# Patient Record
Sex: FEMALE | Race: BLACK OR AFRICAN AMERICAN | NOT HISPANIC OR LATINO | Employment: FULL TIME | URBAN - METROPOLITAN AREA
[De-identification: names, ages, dates, MRNs, and addresses within clinical notes are randomized per-mention and may not be internally consistent; named-entity substitution may affect disease eponyms.]

---

## 2016-11-30 LAB
CFTR MUT ANL BLD/T: NEGATIVE
EXTERNAL ABO GROUPING: NORMAL
EXTERNAL ANTIBODY SCREEN: NORMAL
EXTERNAL HEMATOCRIT: 34.6 %
EXTERNAL HEMOGLOBIN: 11.5 G/DL
EXTERNAL HEPATITIS B SURFACE ANTIGEN: NORMAL
EXTERNAL HIV-1 ANTIBODY: NORMAL
EXTERNAL PLATELET COUNT: 225 K/ΜL
EXTERNAL RH FACTOR: POSITIVE
EXTERNAL RUBELLA IGG QUANTITATION: NORMAL
EXTERNAL SYPHILIS RPR SCREEN: NORMAL

## 2017-01-05 ENCOUNTER — ALLSCRIPTS OFFICE VISIT (OUTPATIENT)
Dept: PERINATAL CARE | Facility: CLINIC | Age: 42
End: 2017-01-05
Payer: COMMERCIAL

## 2017-01-05 ENCOUNTER — GENERIC CONVERSION - ENCOUNTER (OUTPATIENT)
Dept: OTHER | Facility: OTHER | Age: 42
End: 2017-01-05

## 2017-01-05 PROCEDURE — 76817 TRANSVAGINAL US OBSTETRIC: CPT | Performed by: OBSTETRICS & GYNECOLOGY

## 2017-01-05 PROCEDURE — 76805 OB US >/= 14 WKS SNGL FETUS: CPT | Performed by: OBSTETRICS & GYNECOLOGY

## 2017-01-12 ENCOUNTER — ALLSCRIPTS OFFICE VISIT (OUTPATIENT)
Dept: PERINATAL CARE | Facility: CLINIC | Age: 42
End: 2017-01-12
Payer: COMMERCIAL

## 2017-01-12 ENCOUNTER — GENERIC CONVERSION - ENCOUNTER (OUTPATIENT)
Dept: OTHER | Facility: OTHER | Age: 42
End: 2017-01-12

## 2017-01-12 PROCEDURE — 76817 TRANSVAGINAL US OBSTETRIC: CPT | Performed by: OBSTETRICS & GYNECOLOGY

## 2017-01-14 ENCOUNTER — ANESTHESIA (OUTPATIENT)
Dept: LABOR AND DELIVERY | Facility: HOSPITAL | Age: 42
End: 2017-01-14
Payer: COMMERCIAL

## 2017-01-14 ENCOUNTER — HOSPITAL ENCOUNTER (OUTPATIENT)
Facility: HOSPITAL | Age: 42
Setting detail: OBSERVATION
Discharge: HOME | End: 2017-01-14
Attending: OBSTETRICS & GYNECOLOGY | Admitting: OBSTETRICS & GYNECOLOGY
Payer: COMMERCIAL

## 2017-01-14 ENCOUNTER — HOSPITAL ENCOUNTER (OUTPATIENT)
Facility: HOSPITAL | Age: 42
Setting detail: SURGERY ADMIT
End: 2017-01-14
Attending: OBSTETRICS & GYNECOLOGY | Admitting: OBSTETRICS & GYNECOLOGY
Payer: COMMERCIAL

## 2017-01-14 ENCOUNTER — ANESTHESIA EVENT (OUTPATIENT)
Dept: LABOR AND DELIVERY | Facility: HOSPITAL | Age: 42
End: 2017-01-14
Payer: COMMERCIAL

## 2017-01-14 VITALS
DIASTOLIC BLOOD PRESSURE: 66 MMHG | HEIGHT: 70 IN | HEART RATE: 80 BPM | TEMPERATURE: 98.1 F | OXYGEN SATURATION: 100 % | BODY MASS INDEX: 27.77 KG/M2 | SYSTOLIC BLOOD PRESSURE: 113 MMHG | WEIGHT: 194 LBS | RESPIRATION RATE: 16 BRPM

## 2017-01-14 PROBLEM — O09.899 H/O PRETERM DELIVERY, CURRENTLY PREGNANT: Status: ACTIVE | Noted: 2017-01-14

## 2017-01-14 PROBLEM — O26.872 CERVICAL SHORTENING DURING PREGNANCY IN SECOND TRIMESTER: Status: ACTIVE | Noted: 2017-01-14

## 2017-01-14 LAB
ABO GROUP BLD: NORMAL
BLD GP AB SCN SERPL QL: NEGATIVE
ERYTHROCYTE [DISTWIDTH] IN BLOOD BY AUTOMATED COUNT: 13.6 % (ref 11.6–15.1)
HCT VFR BLD AUTO: 33.4 % (ref 34.8–46.1)
HGB BLD-MCNC: 11.3 G/DL (ref 11.5–15.4)
MCH RBC QN AUTO: 31.7 PG (ref 26.8–34.3)
MCHC RBC AUTO-ENTMCNC: 33.8 G/DL (ref 31.4–37.4)
MCV RBC AUTO: 94 FL (ref 82–98)
PLATELET # BLD AUTO: 181 THOUSANDS/UL (ref 149–390)
PMV BLD AUTO: 10.5 FL (ref 8.9–12.7)
RBC # BLD AUTO: 3.57 MILLION/UL (ref 3.81–5.12)
RH BLD: POSITIVE
WBC # BLD AUTO: 5.84 THOUSAND/UL (ref 4.31–10.16)

## 2017-01-14 PROCEDURE — 86900 BLOOD TYPING SEROLOGIC ABO: CPT | Performed by: OBSTETRICS & GYNECOLOGY

## 2017-01-14 PROCEDURE — 86901 BLOOD TYPING SEROLOGIC RH(D): CPT | Performed by: OBSTETRICS & GYNECOLOGY

## 2017-01-14 PROCEDURE — 85027 COMPLETE CBC AUTOMATED: CPT | Performed by: OBSTETRICS & GYNECOLOGY

## 2017-01-14 PROCEDURE — 86850 RBC ANTIBODY SCREEN: CPT | Performed by: OBSTETRICS & GYNECOLOGY

## 2017-01-14 RX ORDER — INDOMETHACIN 25 MG/1
25 CAPSULE ORAL EVERY 6 HOURS
Status: DISCONTINUED | OUTPATIENT
Start: 2017-01-14 | End: 2017-01-14 | Stop reason: HOSPADM

## 2017-01-14 RX ORDER — INDOMETHACIN 25 MG/1
50 CAPSULE ORAL ONCE
Status: COMPLETED | OUTPATIENT
Start: 2017-01-14 | End: 2017-01-14

## 2017-01-14 RX ORDER — ONDANSETRON 2 MG/ML
4 INJECTION INTRAMUSCULAR; INTRAVENOUS ONCE
Status: DISCONTINUED | OUTPATIENT
Start: 2017-01-14 | End: 2017-01-14 | Stop reason: HOSPADM

## 2017-01-14 RX ORDER — SODIUM CHLORIDE, SODIUM LACTATE, POTASSIUM CHLORIDE, CALCIUM CHLORIDE 600; 310; 30; 20 MG/100ML; MG/100ML; MG/100ML; MG/100ML
125 INJECTION, SOLUTION INTRAVENOUS CONTINUOUS
Status: DISCONTINUED | OUTPATIENT
Start: 2017-01-14 | End: 2017-01-14 | Stop reason: HOSPADM

## 2017-01-14 RX ORDER — ACETAMINOPHEN 325 MG/1
650 TABLET ORAL EVERY 4 HOURS PRN
Status: DISCONTINUED | OUTPATIENT
Start: 2017-01-14 | End: 2017-01-14 | Stop reason: HOSPADM

## 2017-01-14 RX ORDER — FENTANYL CITRATE/PF 50 MCG/ML
25 SYRINGE (ML) INJECTION
Status: DISCONTINUED | OUTPATIENT
Start: 2017-01-14 | End: 2017-01-14 | Stop reason: HOSPADM

## 2017-01-14 RX ORDER — SODIUM CHLORIDE, SODIUM LACTATE, POTASSIUM CHLORIDE, CALCIUM CHLORIDE 600; 310; 30; 20 MG/100ML; MG/100ML; MG/100ML; MG/100ML
125 INJECTION, SOLUTION INTRAVENOUS CONTINUOUS
Status: DISCONTINUED | OUTPATIENT
Start: 2017-01-14 | End: 2017-01-14 | Stop reason: SDUPTHER

## 2017-01-14 RX ORDER — BUPIVACAINE HYDROCHLORIDE 7.5 MG/ML
INJECTION, SOLUTION EPIDURAL; RETROBULBAR AS NEEDED
Status: DISCONTINUED | OUTPATIENT
Start: 2017-01-14 | End: 2017-01-14 | Stop reason: SURG

## 2017-01-14 RX ADMIN — SODIUM CHLORIDE, SODIUM LACTATE, POTASSIUM CHLORIDE, AND CALCIUM CHLORIDE 125 ML/HR: .6; .31; .03; .02 INJECTION, SOLUTION INTRAVENOUS at 07:10

## 2017-01-14 RX ADMIN — INDOMETHACIN 50 MG: 25 CAPSULE ORAL at 07:34

## 2017-01-14 RX ADMIN — BUPIVACAINE HYDROCHLORIDE 1 ML: 7.5 INJECTION, SOLUTION EPIDURAL; RETROBULBAR at 08:19

## 2017-01-18 ENCOUNTER — GENERIC CONVERSION - ENCOUNTER (OUTPATIENT)
Dept: OTHER | Facility: OTHER | Age: 42
End: 2017-01-18

## 2017-01-18 ENCOUNTER — ALLSCRIPTS OFFICE VISIT (OUTPATIENT)
Dept: PERINATAL CARE | Facility: CLINIC | Age: 42
End: 2017-01-18
Payer: COMMERCIAL

## 2017-01-18 PROCEDURE — 76817 TRANSVAGINAL US OBSTETRIC: CPT | Performed by: OBSTETRICS & GYNECOLOGY

## 2017-01-18 PROCEDURE — 76815 OB US LIMITED FETUS(S): CPT | Performed by: OBSTETRICS & GYNECOLOGY

## 2017-01-31 ENCOUNTER — ALLSCRIPTS OFFICE VISIT (OUTPATIENT)
Dept: PERINATAL CARE | Facility: CLINIC | Age: 42
End: 2017-01-31
Payer: COMMERCIAL

## 2017-01-31 ENCOUNTER — GENERIC CONVERSION - ENCOUNTER (OUTPATIENT)
Dept: OTHER | Facility: OTHER | Age: 42
End: 2017-01-31

## 2017-01-31 PROCEDURE — 76817 TRANSVAGINAL US OBSTETRIC: CPT | Performed by: OBSTETRICS & GYNECOLOGY

## 2017-01-31 PROCEDURE — 76811 OB US DETAILED SNGL FETUS: CPT | Performed by: OBSTETRICS & GYNECOLOGY

## 2017-03-30 ENCOUNTER — GENERIC CONVERSION - ENCOUNTER (OUTPATIENT)
Dept: OTHER | Facility: OTHER | Age: 42
End: 2017-03-30

## 2017-03-30 ENCOUNTER — APPOINTMENT (OUTPATIENT)
Dept: PERINATAL CARE | Facility: CLINIC | Age: 42
End: 2017-03-30
Payer: COMMERCIAL

## 2017-03-30 ENCOUNTER — ALLSCRIPTS OFFICE VISIT (OUTPATIENT)
Dept: PERINATAL CARE | Facility: CLINIC | Age: 42
End: 2017-03-30
Payer: COMMERCIAL

## 2017-03-30 PROCEDURE — 76816 OB US FOLLOW-UP PER FETUS: CPT | Performed by: OBSTETRICS & GYNECOLOGY

## 2017-03-30 PROCEDURE — G0108 DIAB MANAGE TRN  PER INDIV: HCPCS | Performed by: OBSTETRICS & GYNECOLOGY

## 2017-04-06 ENCOUNTER — APPOINTMENT (OUTPATIENT)
Dept: PERINATAL CARE | Facility: CLINIC | Age: 42
End: 2017-04-06
Payer: COMMERCIAL

## 2017-04-06 ENCOUNTER — GENERIC CONVERSION - ENCOUNTER (OUTPATIENT)
Dept: OTHER | Facility: OTHER | Age: 42
End: 2017-04-06

## 2017-04-06 PROCEDURE — G0108 DIAB MANAGE TRN  PER INDIV: HCPCS | Performed by: OBSTETRICS & GYNECOLOGY

## 2017-04-27 ENCOUNTER — ALLSCRIPTS OFFICE VISIT (OUTPATIENT)
Dept: PERINATAL CARE | Facility: CLINIC | Age: 42
End: 2017-04-27
Payer: COMMERCIAL

## 2017-04-27 ENCOUNTER — GENERIC CONVERSION - ENCOUNTER (OUTPATIENT)
Dept: OTHER | Facility: OTHER | Age: 42
End: 2017-04-27

## 2017-04-27 PROCEDURE — 76816 OB US FOLLOW-UP PER FETUS: CPT | Performed by: OBSTETRICS & GYNECOLOGY

## 2017-05-18 LAB — EXTERNAL GROUP B STREP ANTIGEN: POSITIVE

## 2017-05-24 ENCOUNTER — GENERIC CONVERSION - ENCOUNTER (OUTPATIENT)
Dept: OTHER | Facility: OTHER | Age: 42
End: 2017-05-24

## 2017-05-24 ENCOUNTER — ALLSCRIPTS OFFICE VISIT (OUTPATIENT)
Dept: PERINATAL CARE | Facility: CLINIC | Age: 42
End: 2017-05-24
Payer: COMMERCIAL

## 2017-05-24 ENCOUNTER — APPOINTMENT (OUTPATIENT)
Dept: PERINATAL CARE | Facility: CLINIC | Age: 42
End: 2017-05-24
Payer: COMMERCIAL

## 2017-05-24 PROCEDURE — 76818 FETAL BIOPHYS PROFILE W/NST: CPT | Performed by: OBSTETRICS & GYNECOLOGY

## 2017-05-24 PROCEDURE — 76816 OB US FOLLOW-UP PER FETUS: CPT | Performed by: OBSTETRICS & GYNECOLOGY

## 2017-05-30 ENCOUNTER — ALLSCRIPTS OFFICE VISIT (OUTPATIENT)
Dept: PERINATAL CARE | Facility: CLINIC | Age: 42
End: 2017-05-30
Payer: COMMERCIAL

## 2017-05-30 ENCOUNTER — GENERIC CONVERSION - ENCOUNTER (OUTPATIENT)
Dept: OTHER | Facility: OTHER | Age: 42
End: 2017-05-30

## 2017-05-30 PROCEDURE — 76818 FETAL BIOPHYS PROFILE W/NST: CPT | Performed by: OBSTETRICS & GYNECOLOGY

## 2017-06-02 ENCOUNTER — APPOINTMENT (OUTPATIENT)
Dept: PERINATAL CARE | Facility: CLINIC | Age: 42
End: 2017-06-02
Payer: COMMERCIAL

## 2017-06-02 ENCOUNTER — GENERIC CONVERSION - ENCOUNTER (OUTPATIENT)
Dept: OTHER | Facility: OTHER | Age: 42
End: 2017-06-02

## 2017-06-02 ENCOUNTER — ALLSCRIPTS OFFICE VISIT (OUTPATIENT)
Dept: PERINATAL CARE | Facility: CLINIC | Age: 42
End: 2017-06-02
Payer: COMMERCIAL

## 2017-06-02 PROCEDURE — G0108 DIAB MANAGE TRN  PER INDIV: HCPCS | Performed by: OBSTETRICS & GYNECOLOGY

## 2017-06-02 PROCEDURE — 59025 FETAL NON-STRESS TEST: CPT | Performed by: OBSTETRICS & GYNECOLOGY

## 2017-06-06 ENCOUNTER — ALLSCRIPTS OFFICE VISIT (OUTPATIENT)
Dept: PERINATAL CARE | Facility: CLINIC | Age: 42
DRG: 560 | End: 2017-06-06
Payer: COMMERCIAL

## 2017-06-06 ENCOUNTER — GENERIC CONVERSION - ENCOUNTER (OUTPATIENT)
Dept: OTHER | Facility: OTHER | Age: 42
End: 2017-06-06

## 2017-06-06 ENCOUNTER — HOSPITAL ENCOUNTER (INPATIENT)
Facility: HOSPITAL | Age: 42
LOS: 2 days | Discharge: HOME | DRG: 560 | End: 2017-06-08
Attending: OBSTETRICS & GYNECOLOGY | Admitting: OBSTETRICS & GYNECOLOGY
Payer: COMMERCIAL

## 2017-06-06 DIAGNOSIS — O26.872: ICD-10-CM

## 2017-06-06 DIAGNOSIS — Z3A.38 38 WEEKS GESTATION OF PREGNANCY: Primary | ICD-10-CM

## 2017-06-06 DIAGNOSIS — O09.899 H/O PRETERM DELIVERY, CURRENTLY PREGNANT: ICD-10-CM

## 2017-06-06 LAB
ABO GROUP BLD: NORMAL
BASOPHILS # BLD AUTO: 0.01 THOUSANDS/ΜL (ref 0–0.1)
BASOPHILS NFR BLD AUTO: 0 % (ref 0–1)
BLD GP AB SCN SERPL QL: NEGATIVE
EOSINOPHIL # BLD AUTO: 0.06 THOUSAND/ΜL (ref 0–0.61)
EOSINOPHIL NFR BLD AUTO: 1 % (ref 0–6)
ERYTHROCYTE [DISTWIDTH] IN BLOOD BY AUTOMATED COUNT: 13.3 % (ref 11.6–15.1)
GLUCOSE SERPL-MCNC: 131 MG/DL (ref 65–140)
GLUCOSE SERPL-MCNC: 132 MG/DL (ref 65–140)
GLUCOSE SERPL-MCNC: 62 MG/DL (ref 65–140)
GLUCOSE SERPL-MCNC: 63 MG/DL (ref 65–140)
GLUCOSE SERPL-MCNC: 92 MG/DL (ref 65–140)
GLUCOSE SERPL-MCNC: 97 MG/DL (ref 65–140)
HCT VFR BLD AUTO: 38.4 % (ref 34.8–46.1)
HGB BLD-MCNC: 13.2 G/DL (ref 11.5–15.4)
LYMPHOCYTES # BLD AUTO: 1.62 THOUSANDS/ΜL (ref 0.6–4.47)
LYMPHOCYTES NFR BLD AUTO: 30 % (ref 14–44)
MCH RBC QN AUTO: 33 PG (ref 26.8–34.3)
MCHC RBC AUTO-ENTMCNC: 34.4 G/DL (ref 31.4–37.4)
MCV RBC AUTO: 96 FL (ref 82–98)
MONOCYTES # BLD AUTO: 0.62 THOUSAND/ΜL (ref 0.17–1.22)
MONOCYTES NFR BLD AUTO: 11 % (ref 4–12)
NEUTROPHILS # BLD AUTO: 3.1 THOUSANDS/ΜL (ref 1.85–7.62)
NEUTS SEG NFR BLD AUTO: 58 % (ref 43–75)
NRBC BLD AUTO-RTO: 0 /100 WBCS
PLATELET # BLD AUTO: 177 THOUSANDS/UL (ref 149–390)
PMV BLD AUTO: 11.2 FL (ref 8.9–12.7)
RBC # BLD AUTO: 4 MILLION/UL (ref 3.81–5.12)
RH BLD: POSITIVE
SPECIMEN EXPIRATION DATE: NORMAL
WBC # BLD AUTO: 5.42 THOUSAND/UL (ref 4.31–10.16)

## 2017-06-06 PROCEDURE — 86901 BLOOD TYPING SEROLOGIC RH(D): CPT | Performed by: OBSTETRICS & GYNECOLOGY

## 2017-06-06 PROCEDURE — 85025 COMPLETE CBC W/AUTO DIFF WBC: CPT | Performed by: OBSTETRICS & GYNECOLOGY

## 2017-06-06 PROCEDURE — 86850 RBC ANTIBODY SCREEN: CPT | Performed by: OBSTETRICS & GYNECOLOGY

## 2017-06-06 PROCEDURE — 86592 SYPHILIS TEST NON-TREP QUAL: CPT | Performed by: OBSTETRICS & GYNECOLOGY

## 2017-06-06 PROCEDURE — 4A1HXCZ MONITORING OF PRODUCTS OF CONCEPTION, CARDIAC RATE, EXTERNAL APPROACH: ICD-10-PCS | Performed by: OBSTETRICS & GYNECOLOGY

## 2017-06-06 PROCEDURE — 82948 REAGENT STRIP/BLOOD GLUCOSE: CPT

## 2017-06-06 PROCEDURE — 3E033VJ INTRODUCTION OF OTHER HORMONE INTO PERIPHERAL VEIN, PERCUTANEOUS APPROACH: ICD-10-PCS | Performed by: OBSTETRICS & GYNECOLOGY

## 2017-06-06 PROCEDURE — 86900 BLOOD TYPING SEROLOGIC ABO: CPT | Performed by: OBSTETRICS & GYNECOLOGY

## 2017-06-06 PROCEDURE — 0UCC7ZZ EXTIRPATION OF MATTER FROM CERVIX, VIA NATURAL OR ARTIFICIAL OPENING: ICD-10-PCS | Performed by: OBSTETRICS & GYNECOLOGY

## 2017-06-06 PROCEDURE — 76818 FETAL BIOPHYS PROFILE W/NST: CPT | Performed by: OBSTETRICS & GYNECOLOGY

## 2017-06-06 RX ORDER — OXYTOCIN/RINGER'S LACTATE 30/500 ML
1-30 PLASTIC BAG, INJECTION (ML) INTRAVENOUS
Status: DISCONTINUED | OUTPATIENT
Start: 2017-06-06 | End: 2017-06-07

## 2017-06-06 RX ORDER — SODIUM CHLORIDE, SODIUM LACTATE, POTASSIUM CHLORIDE, CALCIUM CHLORIDE 600; 310; 30; 20 MG/100ML; MG/100ML; MG/100ML; MG/100ML
125 INJECTION, SOLUTION INTRAVENOUS CONTINUOUS
Status: DISCONTINUED | OUTPATIENT
Start: 2017-06-06 | End: 2017-06-07

## 2017-06-06 RX ORDER — SODIUM CHLORIDE 9 MG/ML
125 INJECTION, SOLUTION INTRAVENOUS CONTINUOUS
Status: DISCONTINUED | OUTPATIENT
Start: 2017-06-06 | End: 2017-06-07

## 2017-06-06 RX ADMIN — Medication 2 MILLI-UNITS/MIN: at 18:40

## 2017-06-06 RX ADMIN — SODIUM CHLORIDE 2.5 MILLION UNITS: 9 INJECTION, SOLUTION INTRAVENOUS at 21:12

## 2017-06-06 RX ADMIN — SODIUM CHLORIDE, SODIUM LACTATE, POTASSIUM CHLORIDE, AND CALCIUM CHLORIDE 125 ML/HR: .6; .31; .03; .02 INJECTION, SOLUTION INTRAVENOUS at 14:49

## 2017-06-06 RX ADMIN — SODIUM CHLORIDE 5 MILLION UNITS: 0.9 INJECTION, SOLUTION INTRAVENOUS at 16:52

## 2017-06-06 RX ADMIN — SODIUM CHLORIDE 125 ML/HR: 0.9 INJECTION, SOLUTION INTRAVENOUS at 20:14

## 2017-06-07 ENCOUNTER — ANESTHESIA (INPATIENT)
Dept: LABOR AND DELIVERY | Facility: HOSPITAL | Age: 42
DRG: 560 | End: 2017-06-07
Payer: COMMERCIAL

## 2017-06-07 PROBLEM — O09.529 ADVANCED MATERNAL AGE IN MULTIGRAVIDA: Status: ACTIVE | Noted: 2017-06-07

## 2017-06-07 PROBLEM — Z98.891 H/O CESAREAN SECTION: Status: ACTIVE | Noted: 2017-06-07

## 2017-06-07 PROBLEM — O24.410 GESTATIONAL DIABETES MELLITUS, CLASS A1: Status: ACTIVE | Noted: 2017-06-07

## 2017-06-07 LAB
BASE EXCESS BLDCOA CALC-SCNC: -4.1 MMOL/L (ref 3–11)
BASE EXCESS BLDCOV CALC-SCNC: -4.8 MMOL/L (ref 1–9)
GLUCOSE SERPL-MCNC: 70 MG/DL (ref 65–140)
GLUCOSE SERPL-MCNC: 78 MG/DL (ref 65–140)
GLUCOSE SERPL-MCNC: 92 MG/DL (ref 65–140)
HCO3 BLDCOA-SCNC: 24 MMOL/L (ref 17.3–27.3)
HCO3 BLDCOV-SCNC: 21.6 MMOL/L (ref 12.2–28.6)
O2 CT VFR BLDCOA CALC: 2.8 ML/DL
OXYHGB MFR BLDCOA: 12.4 %
OXYHGB MFR BLDCOV: 50.3 %
PCO2 BLDCOA: 55.8 MM[HG] (ref 30–60)
PCO2 BLDCOV: 44.4 MM HG (ref 27–43)
PH BLDCOA: 7.25 [PH] (ref 7.23–7.43)
PH BLDCOV: 7.3 [PH] (ref 7.19–7.49)
PO2 BLDCOA: 10.4 MM HG (ref 5–25)
PO2 BLDCOV: 24.7 MM HG (ref 15–45)
RPR SER QL: NORMAL
SAO2 % BLDCOV: 11.6 ML/DL

## 2017-06-07 PROCEDURE — 10907ZC DRAINAGE OF AMNIOTIC FLUID, THERAPEUTIC FROM PRODUCTS OF CONCEPTION, VIA NATURAL OR ARTIFICIAL OPENING: ICD-10-PCS | Performed by: OBSTETRICS & GYNECOLOGY

## 2017-06-07 PROCEDURE — 82948 REAGENT STRIP/BLOOD GLUCOSE: CPT

## 2017-06-07 PROCEDURE — 0KQM0ZZ REPAIR PERINEUM MUSCLE, OPEN APPROACH: ICD-10-PCS | Performed by: OBSTETRICS & GYNECOLOGY

## 2017-06-07 PROCEDURE — 82805 BLOOD GASES W/O2 SATURATION: CPT | Performed by: OBSTETRICS & GYNECOLOGY

## 2017-06-07 RX ORDER — FENTANYL CITRATE 50 UG/ML
INJECTION, SOLUTION INTRAMUSCULAR; INTRAVENOUS AS NEEDED
Status: DISCONTINUED | OUTPATIENT
Start: 2017-06-07 | End: 2017-06-07 | Stop reason: SURG

## 2017-06-07 RX ORDER — MAGNESIUM HYDROXIDE/ALUMINUM HYDROXICE/SIMETHICONE 120; 1200; 1200 MG/30ML; MG/30ML; MG/30ML
15 SUSPENSION ORAL EVERY 6 HOURS PRN
Status: DISCONTINUED | OUTPATIENT
Start: 2017-06-07 | End: 2017-06-08 | Stop reason: HOSPADM

## 2017-06-07 RX ORDER — DOCUSATE SODIUM 100 MG/1
100 CAPSULE, LIQUID FILLED ORAL 2 TIMES DAILY PRN
Status: DISCONTINUED | OUTPATIENT
Start: 2017-06-07 | End: 2017-06-08 | Stop reason: HOSPADM

## 2017-06-07 RX ORDER — ONDANSETRON 2 MG/ML
4 INJECTION INTRAMUSCULAR; INTRAVENOUS EVERY 8 HOURS PRN
Status: DISCONTINUED | OUTPATIENT
Start: 2017-06-07 | End: 2017-06-08 | Stop reason: HOSPADM

## 2017-06-07 RX ORDER — FENTANYL CITRATE 50 UG/ML
INJECTION, SOLUTION INTRAMUSCULAR; INTRAVENOUS
Status: DISPENSED
Start: 2017-06-07 | End: 2017-06-07

## 2017-06-07 RX ORDER — ACETAMINOPHEN 325 MG/1
650 TABLET ORAL EVERY 6 HOURS PRN
Status: DISCONTINUED | OUTPATIENT
Start: 2017-06-07 | End: 2017-06-08 | Stop reason: HOSPADM

## 2017-06-07 RX ORDER — DIAPER,BRIEF,INFANT-TODD,DISP
1 EACH MISCELLANEOUS AS NEEDED
Status: DISCONTINUED | OUTPATIENT
Start: 2017-06-07 | End: 2017-06-08 | Stop reason: HOSPADM

## 2017-06-07 RX ORDER — OXYTOCIN/RINGER'S LACTATE 30/500 ML
250 PLASTIC BAG, INJECTION (ML) INTRAVENOUS CONTINUOUS
Status: ACTIVE | OUTPATIENT
Start: 2017-06-07 | End: 2017-06-07

## 2017-06-07 RX ORDER — DIPHENHYDRAMINE HCL 25 MG
25 TABLET ORAL EVERY 4 HOURS PRN
Status: DISCONTINUED | OUTPATIENT
Start: 2017-06-07 | End: 2017-06-08 | Stop reason: HOSPADM

## 2017-06-07 RX ORDER — OXYCODONE HYDROCHLORIDE AND ACETAMINOPHEN 5; 325 MG/1; MG/1
1 TABLET ORAL EVERY 4 HOURS PRN
Status: DISCONTINUED | OUTPATIENT
Start: 2017-06-07 | End: 2017-06-08 | Stop reason: HOSPADM

## 2017-06-07 RX ORDER — OXYCODONE HYDROCHLORIDE AND ACETAMINOPHEN 5; 325 MG/1; MG/1
2 TABLET ORAL EVERY 4 HOURS PRN
Status: DISCONTINUED | OUTPATIENT
Start: 2017-06-07 | End: 2017-06-08 | Stop reason: HOSPADM

## 2017-06-07 RX ORDER — BUPIVACAINE HYDROCHLORIDE 2.5 MG/ML
INJECTION, SOLUTION INFILTRATION; PERINEURAL AS NEEDED
Status: DISCONTINUED | OUTPATIENT
Start: 2017-06-07 | End: 2017-06-07 | Stop reason: SURG

## 2017-06-07 RX ORDER — IBUPROFEN 600 MG/1
600 TABLET ORAL EVERY 6 HOURS PRN
Status: DISCONTINUED | OUTPATIENT
Start: 2017-06-07 | End: 2017-06-08 | Stop reason: HOSPADM

## 2017-06-07 RX ADMIN — DOCUSATE SODIUM 100 MG: 100 CAPSULE, LIQUID FILLED ORAL at 16:44

## 2017-06-07 RX ADMIN — FENTANYL CITRATE 10 MCG: 50 INJECTION, SOLUTION INTRAMUSCULAR; INTRAVENOUS at 00:33

## 2017-06-07 RX ADMIN — BUPIVACAINE HYDROCHLORIDE 1 ML: 2.5 INJECTION, SOLUTION INFILTRATION; PERINEURAL at 00:33

## 2017-06-07 RX ADMIN — ROPIVACAINE HYDROCHLORIDE: 2 INJECTION, SOLUTION EPIDURAL; INFILTRATION at 00:42

## 2017-06-07 RX ADMIN — SODIUM CHLORIDE 125 ML/HR: 0.9 INJECTION, SOLUTION INTRAVENOUS at 02:01

## 2017-06-07 RX ADMIN — SODIUM CHLORIDE 2.5 MILLION UNITS: 9 INJECTION, SOLUTION INTRAVENOUS at 00:46

## 2017-06-07 RX ADMIN — IBUPROFEN 600 MG: 600 TABLET, FILM COATED ORAL at 16:44

## 2017-06-08 VITALS
HEART RATE: 88 BPM | DIASTOLIC BLOOD PRESSURE: 71 MMHG | HEIGHT: 68 IN | WEIGHT: 200 LBS | TEMPERATURE: 98.6 F | BODY MASS INDEX: 30.31 KG/M2 | OXYGEN SATURATION: 98 % | RESPIRATION RATE: 18 BRPM | SYSTOLIC BLOOD PRESSURE: 109 MMHG

## 2017-06-08 RX ORDER — ACETAMINOPHEN 325 MG/1
TABLET ORAL
Qty: 30 TABLET | Refills: 0 | Status: SHIPPED | OUTPATIENT
Start: 2017-06-08 | End: 2017-06-08 | Stop reason: HOSPADM

## 2017-06-08 RX ORDER — IBUPROFEN 600 MG/1
600 TABLET ORAL EVERY 6 HOURS PRN
Qty: 30 TABLET | Refills: 0
Start: 2017-06-08 | End: 2017-06-08 | Stop reason: HOSPADM

## 2017-06-08 RX ORDER — DOCUSATE SODIUM 100 MG/1
100 CAPSULE, LIQUID FILLED ORAL 2 TIMES DAILY PRN
Qty: 10 CAPSULE | Refills: 0
Start: 2017-06-08 | End: 2017-06-08 | Stop reason: HOSPADM

## 2017-06-16 LAB — PLACENTA IN STORAGE: NORMAL

## 2017-07-13 PROBLEM — Z30.2 ENCOUNTER FOR STERILIZATION: Status: ACTIVE | Noted: 2017-07-13

## 2017-07-17 ENCOUNTER — ANESTHESIA EVENT (OUTPATIENT)
Dept: PERIOP | Facility: HOSPITAL | Age: 42
End: 2017-07-17
Payer: COMMERCIAL

## 2017-07-17 ENCOUNTER — HOSPITAL ENCOUNTER (OUTPATIENT)
Facility: HOSPITAL | Age: 42
Setting detail: OUTPATIENT SURGERY
Discharge: HOME | End: 2017-07-17
Attending: OBSTETRICS & GYNECOLOGY | Admitting: OBSTETRICS & GYNECOLOGY
Payer: COMMERCIAL

## 2017-07-17 ENCOUNTER — ANESTHESIA (OUTPATIENT)
Dept: PERIOP | Facility: HOSPITAL | Age: 42
End: 2017-07-17
Payer: COMMERCIAL

## 2017-07-17 VITALS
HEART RATE: 68 BPM | TEMPERATURE: 97.2 F | HEIGHT: 66 IN | DIASTOLIC BLOOD PRESSURE: 60 MMHG | OXYGEN SATURATION: 99 % | RESPIRATION RATE: 16 BRPM | SYSTOLIC BLOOD PRESSURE: 128 MMHG | BODY MASS INDEX: 29.73 KG/M2 | WEIGHT: 185 LBS

## 2017-07-17 DIAGNOSIS — Z30.2 ENCOUNTER FOR STERILIZATION: ICD-10-CM

## 2017-07-17 LAB — EXT PREGNANCY TEST URINE: NEGATIVE

## 2017-07-17 PROCEDURE — 88302 TISSUE EXAM BY PATHOLOGIST: CPT | Performed by: OBSTETRICS & GYNECOLOGY

## 2017-07-17 PROCEDURE — 81025 URINE PREGNANCY TEST: CPT | Performed by: OBSTETRICS & GYNECOLOGY

## 2017-07-17 RX ORDER — MEPERIDINE HYDROCHLORIDE 25 MG/ML
12.5 INJECTION INTRAMUSCULAR; INTRAVENOUS; SUBCUTANEOUS AS NEEDED
Status: DISCONTINUED | OUTPATIENT
Start: 2017-07-17 | End: 2017-07-17 | Stop reason: HOSPADM

## 2017-07-17 RX ORDER — ALBUTEROL SULFATE 2.5 MG/3ML
2.5 SOLUTION RESPIRATORY (INHALATION) ONCE AS NEEDED
Status: DISCONTINUED | OUTPATIENT
Start: 2017-07-17 | End: 2017-07-17 | Stop reason: HOSPADM

## 2017-07-17 RX ORDER — GLYCOPYRROLATE 0.2 MG/ML
INJECTION INTRAMUSCULAR; INTRAVENOUS AS NEEDED
Status: DISCONTINUED | OUTPATIENT
Start: 2017-07-17 | End: 2017-07-17 | Stop reason: SURG

## 2017-07-17 RX ORDER — ONDANSETRON 2 MG/ML
INJECTION INTRAMUSCULAR; INTRAVENOUS AS NEEDED
Status: DISCONTINUED | OUTPATIENT
Start: 2017-07-17 | End: 2017-07-17 | Stop reason: SURG

## 2017-07-17 RX ORDER — ROCURONIUM BROMIDE 10 MG/ML
INJECTION, SOLUTION INTRAVENOUS AS NEEDED
Status: DISCONTINUED | OUTPATIENT
Start: 2017-07-17 | End: 2017-07-17 | Stop reason: SURG

## 2017-07-17 RX ORDER — BUPIVACAINE HYDROCHLORIDE 2.5 MG/ML
INJECTION, SOLUTION INFILTRATION; PERINEURAL AS NEEDED
Status: DISCONTINUED | OUTPATIENT
Start: 2017-07-17 | End: 2017-07-17 | Stop reason: HOSPADM

## 2017-07-17 RX ORDER — SODIUM CHLORIDE, SODIUM LACTATE, POTASSIUM CHLORIDE, CALCIUM CHLORIDE 600; 310; 30; 20 MG/100ML; MG/100ML; MG/100ML; MG/100ML
125 INJECTION, SOLUTION INTRAVENOUS CONTINUOUS
Status: DISCONTINUED | OUTPATIENT
Start: 2017-07-17 | End: 2017-07-17 | Stop reason: HOSPADM

## 2017-07-17 RX ORDER — OXYCODONE HYDROCHLORIDE AND ACETAMINOPHEN 5; 325 MG/1; MG/1
1-2 TABLET ORAL EVERY 4 HOURS PRN
Refills: 0
Start: 2017-07-17 | End: 2017-07-27

## 2017-07-17 RX ORDER — IBUPROFEN 600 MG/1
600 TABLET ORAL EVERY 6 HOURS PRN
Qty: 30 TABLET | Refills: 0
Start: 2017-07-17

## 2017-07-17 RX ORDER — LIDOCAINE HYDROCHLORIDE 10 MG/ML
INJECTION, SOLUTION INFILTRATION; PERINEURAL AS NEEDED
Status: DISCONTINUED | OUTPATIENT
Start: 2017-07-17 | End: 2017-07-17 | Stop reason: SURG

## 2017-07-17 RX ORDER — FENTANYL CITRATE/PF 50 MCG/ML
25 SYRINGE (ML) INJECTION
Status: DISCONTINUED | OUTPATIENT
Start: 2017-07-17 | End: 2017-07-17 | Stop reason: HOSPADM

## 2017-07-17 RX ORDER — MIDAZOLAM HYDROCHLORIDE 1 MG/ML
INJECTION INTRAMUSCULAR; INTRAVENOUS AS NEEDED
Status: DISCONTINUED | OUTPATIENT
Start: 2017-07-17 | End: 2017-07-17 | Stop reason: SURG

## 2017-07-17 RX ORDER — ONDANSETRON 2 MG/ML
4 INJECTION INTRAMUSCULAR; INTRAVENOUS EVERY 6 HOURS PRN
Status: DISCONTINUED | OUTPATIENT
Start: 2017-07-17 | End: 2017-07-17 | Stop reason: HOSPADM

## 2017-07-17 RX ORDER — SODIUM CHLORIDE 9 MG/ML
125 INJECTION, SOLUTION INTRAVENOUS CONTINUOUS
Status: DISCONTINUED | OUTPATIENT
Start: 2017-07-17 | End: 2017-07-17 | Stop reason: HOSPADM

## 2017-07-17 RX ORDER — KETOROLAC TROMETHAMINE 30 MG/ML
INJECTION, SOLUTION INTRAMUSCULAR; INTRAVENOUS AS NEEDED
Status: DISCONTINUED | OUTPATIENT
Start: 2017-07-17 | End: 2017-07-17 | Stop reason: SURG

## 2017-07-17 RX ORDER — FENTANYL CITRATE 50 UG/ML
INJECTION, SOLUTION INTRAMUSCULAR; INTRAVENOUS AS NEEDED
Status: DISCONTINUED | OUTPATIENT
Start: 2017-07-17 | End: 2017-07-17 | Stop reason: SURG

## 2017-07-17 RX ORDER — IBUPROFEN 600 MG/1
600 TABLET ORAL EVERY 6 HOURS PRN
Status: DISCONTINUED | OUTPATIENT
Start: 2017-07-17 | End: 2017-07-17 | Stop reason: HOSPADM

## 2017-07-17 RX ORDER — ONDANSETRON 2 MG/ML
4 INJECTION INTRAMUSCULAR; INTRAVENOUS ONCE AS NEEDED
Status: DISCONTINUED | OUTPATIENT
Start: 2017-07-17 | End: 2017-07-17 | Stop reason: HOSPADM

## 2017-07-17 RX ORDER — PROPOFOL 10 MG/ML
INJECTION, EMULSION INTRAVENOUS AS NEEDED
Status: DISCONTINUED | OUTPATIENT
Start: 2017-07-17 | End: 2017-07-17 | Stop reason: SURG

## 2017-07-17 RX ORDER — OXYCODONE HYDROCHLORIDE AND ACETAMINOPHEN 5; 325 MG/1; MG/1
2 TABLET ORAL EVERY 4 HOURS PRN
Status: DISCONTINUED | OUTPATIENT
Start: 2017-07-17 | End: 2017-07-17 | Stop reason: HOSPADM

## 2017-07-17 RX ORDER — OXYCODONE HYDROCHLORIDE AND ACETAMINOPHEN 5; 325 MG/1; MG/1
1 TABLET ORAL EVERY 4 HOURS PRN
Status: DISCONTINUED | OUTPATIENT
Start: 2017-07-17 | End: 2017-07-17 | Stop reason: HOSPADM

## 2017-07-17 RX ADMIN — FENTANYL CITRATE 50 MCG: 50 INJECTION INTRAMUSCULAR; INTRAVENOUS at 10:33

## 2017-07-17 RX ADMIN — NEOSTIGMINE METHYLSULFATE 3 MG: 1 INJECTION, SOLUTION INTRAMUSCULAR; INTRAVENOUS; SUBCUTANEOUS at 10:53

## 2017-07-17 RX ADMIN — ONDANSETRON 4 MG: 2 INJECTION INTRAMUSCULAR; INTRAVENOUS at 10:32

## 2017-07-17 RX ADMIN — PROPOFOL 30 MG: 10 INJECTION, EMULSION INTRAVENOUS at 10:32

## 2017-07-17 RX ADMIN — DEXAMETHASONE SODIUM PHOSPHATE 10 MG: 10 INJECTION INTRAMUSCULAR; INTRAVENOUS at 10:16

## 2017-07-17 RX ADMIN — LIDOCAINE HYDROCHLORIDE 50 MG: 10 INJECTION, SOLUTION INFILTRATION; PERINEURAL at 10:19

## 2017-07-17 RX ADMIN — ROCURONIUM BROMIDE 25 MG: 10 INJECTION, SOLUTION INTRAVENOUS at 10:19

## 2017-07-17 RX ADMIN — SODIUM CHLORIDE, SODIUM LACTATE, POTASSIUM CHLORIDE, AND CALCIUM CHLORIDE: .6; .31; .03; .02 INJECTION, SOLUTION INTRAVENOUS at 10:11

## 2017-07-17 RX ADMIN — FENTANYL CITRATE 50 MCG: 50 INJECTION INTRAMUSCULAR; INTRAVENOUS at 10:19

## 2017-07-17 RX ADMIN — KETOROLAC TROMETHAMINE 30 MG: 30 INJECTION, SOLUTION INTRAMUSCULAR at 11:08

## 2017-07-17 RX ADMIN — PROPOFOL 150 MG: 10 INJECTION, EMULSION INTRAVENOUS at 10:19

## 2017-07-17 RX ADMIN — MIDAZOLAM HYDROCHLORIDE 2 MG: 1 INJECTION, SOLUTION INTRAMUSCULAR; INTRAVENOUS at 10:16

## 2017-07-17 RX ADMIN — GLYCOPYRROLATE 0.4 MG: 0.2 INJECTION, SOLUTION INTRAMUSCULAR; INTRAVENOUS at 10:53

## 2018-01-10 NOTE — PROGRESS NOTES
Active Problems    1  Elderly multigravida in second trimester (659 63) (O09 522)   2  Fibroids (218 9) (D25 9)   3  Gestational diabetes mellitus (GDM) in third trimester, gestational diabetes method of   control unspecified (648 83) (O24 419)   4  History of  delivery, currently pregnant in second trimester (V23 41) (O09 212)   5  Short cervix in second trimester, antepartum (793 01) (O26 872)    Family History  Mother    1  No pertinent family history  Father    2  No pertinent family history    Social History    · Never a smoker    Current Meds   1  Accu-Chek FastClix Lancets Miscellaneous; 4 times a day as directed; Therapy: 57UKO4493 to (Evaluate:54Fuc7935)  Requested for: 39UFV0745; Last   Rx:2017 Ordered   2  Accu-Chek SmartView In Vitro Strip; TEST 4 TIMES DAILY; Therapy: 58LNA2751 to (Evaluate:05Sut3446)  Requested for: 01JDX3746; Last   Rx:2017 Ordered   3  Prenatal Vitamin TABS; TAKE 1 TABLET DAILY; Therapy: (Recorded:2017) to Recorded    Allergies    1  No Known Drug Allergies    2  No Known Environmental Allergies   3  No Known Food Allergies    Provider Comments  Provider Comments:   JAY: 2017  EGA: 29 4/ weeks    Dear Dr Mccloud Level: Thank you for referring your patient to the Diabetes and Pregnancy Program at [address] [place] [address]  The patient attended Class 2 received the following education:    Â· Weight gain during in pregnancy  Based on the patient's height of 67 inches, pre-pregnancy weight of 181 pounds (BMI-27  5) we would recommend a total weight gain of 15-25 pounds for the pregnancy  o The patient's current weight is 202 pounds, and her weight gain to date is 21 pounds  Â· Medical Nutrition Therapy for diabetes and pregnancy  The patient's three day food diary was reviewed and discussed  The patient was instructed on the following:  o Individualized meal plan  Adapted her meal plan to her favorite [place] foods    o Use of food diary to maintain a meal plan  Patient has been skipping an HS snack; understands importance of eating 3 snacks now    o Importance of protein as it relates to blood glucose control  Â· Review of blood glucose log  Reinforcement of blood glucose goals and reporting guidelines  Â· Ultrasounds every four weeks in the [address] [place] [address] to evaluate fetal growth  Â· Exercise Guidelines:   o Walking up to thirty minutes daily can reduce blood glucose levels  o Monitor for greater than four contractions per hour    o The patient has been instructed not to begin physical activity if she has been instructed not to exercise by your office  Â· Sick day guidelines  Â· Post-partum guidelines:  o Completion of a 75 gram glucose tolerance test at 6 weeks post-partum to check for type 2 diabetes  o 20% weight loss and 30 minutes of exercise 5 times per week reduces the risk of type 2 diabetes  Hammad Delgadillo Breastfeeding guidelines  Â· Report blood glucose levels to Maternal-Fetal Medicine office weekly or as directed  o Phone: [phone]  o Fax: [phone]  o Email: [email or web address]     Please contact the Diabetes and Pregnancy Program at [phone] if you have questions  Time spent with the patient--9:30-10:25 AM; time spent face to face counseling greater than 50% of the appointment  Sincerely,   Ric Swartz, MS, RD, CDE, LDN  Diabetes Educator  Diabetes and Pregnancy Program        Future Appointments    Date/Time Provider Specialty Site   2017 08:30 AM  [address]     Signatures   Electronically signed by : Anthony Cochran RD; 2017 11:14AM EST                       (Author)    Electronically signed by : Kim Lynn;  Apr 10 2017 12:54PM EST                       (Author)

## 2018-01-10 NOTE — PROGRESS NOTES
Active Problems    1  Elderly multigravida in second trimester (659 63) (O09 522)   2  Fibroids (218 9) (D25 9)   3  Gestational diabetes mellitus (GDM) in third trimester, gestational diabetes method of   control unspecified (648 83) (O24 419)   4  History of  delivery, currently pregnant in second trimester (V23 41) (O09 212)   5  Short cervix in second trimester, antepartum (382 79) (O26 872)    Family History  Mother    1  No pertinent family history  Father    2  No pertinent family history    Social History    · Never a smoker    Current Meds   1  Prenatal Vitamin TABS; TAKE 1 TABLET DAILY; Therapy: (Recorded:2017) to Recorded    Allergies    1  No Known Drug Allergies    2  No Known Environmental Allergies   3  No Known Food Allergies    Plan    1  Accu-Chek FastClix Lancets Miscellaneous; 4 times a day as directed   2  Accu-Chek SmartView In Vitro Strip; TEST 4 TIMES DAILY    Provider Comments  Provider Comments:   JAY: 2017  EGA: 28 4/7 weeks             Dear Dr Ekaterina Blount: Thank you for referring your patient to the Diabetes and Pregnancy Program at [address] [place] [address]  The patient attended Class 1 received the following education:    Â· Pathophysiology of diabetes and pregnancy  This includes maternal-fetal complications such as fetal macrosomia,  hypoglycemia, polyhydramnios, increased incidence of  section, pre-term labor and in severe cases, fetal demise and stillbirth  Â· Self-monitoring of blood glucose levels: fasting (goal 60mg/dl to 90mg/dl) and two hours after the start of the meal less (goal less than 120mg/dl)  The patient was provided with a n Accu-Chek Joy blood glucose meter and supplies  Â· Medical Nutrition Therapy for diabetes and pregnancy   The patient was provided with a 2300 calorie gestational diabetes meal plan and the following was reviewed:   o Basic review of macronutrients   o Meal pattern should consist of three small meals and three snacks daily  o Carbohydrate gram amounts per meal   o Instructions for how to read a food label  o Appropriate serving sizes for carbohydrates and proteins  o Incorporating protein at each meal and snack  o Maintain a three day food diary and bring to class 2  Â· Report blood glucose levels to the [address] [place] [address] Maternal-Fetal Medicine office weekly or as directed:  o Phone : [phone]  o Fax: [phone]  o Email: [email or web address]    The patient is scheduled to attend class 2 on Thursday, 2017  Additionally, fetal ultrasound evaluation by the Perinatologist has been scheduled to assure continuity of care  Please contact the Diabetes and Pregnancy Program at [phone] if you have any questions  Time spent with patient 10:45-11:45 PM; time spent face to face counseling greater than 50% of the appointment    Sincerely,   Venita Figueroa MS, RD, CDE, LDN  Diabetes Educator   Diabetes and Pregnancy Program          Future Appointments    Date/Time Provider Specialty Site   2017 08:30 AM  [place], [address]   Electronically signed by : Rk Stinson RD; Mar 30 2017 12:12PM EST                       (Author)    Electronically signed by : Thomas Reeder; Mar 30 2017  2:01PM EST                       (Author)

## 2018-01-11 NOTE — PROGRESS NOTES
MAY 30 2017         RE: Houston Cowan                                   To: Elena Benson LILY AZALIA    MR#: [id number]                                  [place] [address]   [address]   : MAY 9 4569 Leeann Vázquez [address] [place] [address]   ENC: [id number]:[facility]                             Fax: [phone]   (Exam #: [id number])      The LMP of this 38year old,  G4, P2-0-1-2 patient was unknown, her   working JAY is 2017 and the current gestational age is 42 weeks 2   days by 1st Trimester Sono  A sonographic examination was performed on MAY   30 2017 using real time equipment  The ultrasound examination was   performed using abdominal technique  The patient has a BMI of 29 8  Her   blood pressure today was 109/76  Earliest ultrasound found in her record: 16 12w5d 17 JAY      Problem list:   1  Multiple intramural fibroids x 6- her largest fibroid on her initial   visit was 4 2 x 2 7 x 3 3 cm    2  Advanced maternal age - NIPT was normal   3  History of a prior  section  at term followed by a successful    that was uncomplicated at term  4  History of a 5 month spontaneous  delivery after premature   rupture membranes in her 3rd pregnancy in [facility] in [place], [place] in   Cardiac motion was observed at 155 bpm       INDICATIONS      advanced maternal age   diabetes, gestational      Exam Types      Amniotic Fluid Index      RESULTS      Fetus # 1 of 1   Vertex presentation   Placenta Location = Anterior   No placenta previa   Placenta Grade = II      AMNIOTIC FLUID      Q1: 4 3      Q2: 1 9      Q3: 1 2      Q4: 0 7   CATRACHO Total = 8 1 cm   Amniotic Fluid: Normal      BIOPHYSICAL PROFILE      The Biophysical Profile score was 8/8     Breathin  Movement: 2  Tone: 2  AFV: 2      IMPRESSION      Yadav IUP   37 weeks and 2 days by 1st Tri Sono  (JAY=2017)   Vertex presentation   Regular fetal heart rate of 155 bpm Anterior placenta   No placenta previa      GENERAL COMMENT      The patient presented today for antepartum fetal surveillance secondary to   advanced maternal age and gestational diabetes with episodes of   hyperglycemia  She had a reassuring biophysical profile  The NST was   reactive and reassuring  The amniotic fluid index was 8 1cm, which is   normal for gestational age  Recommend continued twice weekly fetal testing secondary to advanced   maternal age and gestational diabetes with episodes of hyperglycemia  Thank very much for allowing us to participate in the care of your   patient  Should you have any questions, please do not hesitate to contact   our office  MAYDA Melara M D     Electronically signed 05/30/17 11:02

## 2018-01-11 NOTE — PROGRESS NOTES
2017         RE: Toni Leyva                                   To: Azell Buerger, M D    MR#: [id number]                                  [facility]   [address]   : MAY 9 [address]   ENC: [id number]                             Fax: [phone]   (Exam #: [id number])      The LMP of this 46year old,  G4, P2-0-1-2 patient was unknown, her   working JAY is 2017 and the current gestational age is 22 weeks 3   days by [address]  A sonographic examination was performed on 2017 using real time equipment  The ultrasound examination was   performed using abdominal & vaginal techniques  The patient has a BMI of   28 5  Her blood pressure today was 121/79  Earliest ultrasound found in her record: 16 12w5d 17 JAY      Problem list:   1  Multiple intramural fibroids x 6- her largest fibroid on her initial   visit was 4 2 x 2 7 x 3 3 cm    2  Advanced maternal age - NIPT was normal   3  History of a prior  section  at term followed by a successful    that was uncomplicated at term  4  History of a 5 month spontaneous  delivery after premature   rupture membranes in her 3rd pregnancy in [facility] in [facility] in   Kitty Andrade has no complaints  She denies abdominal or pelvic pain, vaginal   bleeding, mucoid vaginal discharge, or suspected PPROM  Kitty Andrade underwent   successful Faye cerclage placement last week following ultrasound   evidence of cervical shortening in this pregnancy with a history of a   prior spontaneous  birth        Cardiac motion was observed at 157 bpm       INDICATIONS      advanced maternal age   previous  delivery   cervical shortening      Exam Types      Transvaginal      RESULTS      Fetus # 1 of 1   Vertex presentation   Placenta Location = Right lateral   No placenta previa   Placenta Grade = 0      AMNIOTIC FLUID Largest Vertical Pocket = 6 1 cm   Amniotic Fluid: Normal      CERVICAL EVALUATION      The cervix appeared normal (Ultrasound Examination)  CERCLAGE     Type of Cerclage: Faye             Emergency?: Yes      SUPINE      Cervical Length: 3 00 cm      OTHER TEST RESULTS           Funneling?: No             Dynamic Changes?: No        Resp  To TFP?: No                      Debris?: No      FIBROIDS      1  Intramural myometrium fibroid located in the right anterior corpus   region, measuring 4 2 x 3 1 x 3 0cm with a volume of 20 4cc  Color doppler   flow was not increased  The appearance was hypoechoic  2  Intramural myometrium fibroid located in the middle posterior corpus   region, measuring 3 0 x 1 8 x 3 1cm with a volume of 8 8cc  Color doppler   flow was not increased  The appearance was hypoechoic  3  Intramural myometrium fibroid located in the middle fundus region,   measuring 2 4 x 2 4 x 2 1cm with a volume of 6 3cc  Color doppler flow was   not increased  The appearance was hypoechoic  IMPRESSION      Yadav IUP   18 weeks and 3 days by 1st Tri Sono  (JAY=JUN 18 2017)   Vertex presentation   Regular fetal heart rate of 157 bpm   Right lateral placenta   No placenta previa      GENERAL COMMENT      The cervix is normal in appearance by transvaginal sonography  The   cervical length is normal   Cervical debris is not present  Cervical   funneling is not present  Neither provocative nor dynamic change is   appreciated  The recently placed cerclage is identified within the lower   portion of the middle third the cervix  Today's ultrasound findings and suggested follow-up were discussed in   detail with Kalpana  She will return to the [facility]  in 2 weeks for   level II ultrasound evaluation  Repeat cervical sonography is recommended   as clinically indicated   Kalpana had previously declined IM progesterone   therapy the indication of a history of a prior spontaneous  birth,   but has agreed to be treated with vaginal  progesterone which will be   initiated soon  The face to face time, in addition to time spent discussing ultrasound   results, was approximately 10 minutes, greater than 50% of which was spent   during counseling and coordination of care  MAYDA Diamond M D     Maternal-Fetal Medicine   Electronically signed 17 18:01

## 2018-01-11 NOTE — PROGRESS NOTES
NICK 2017         RE: Nishant Carrillo                                   To: LILY Ortega    MR#: [id number]                                  [place] [address]   [address]   : MAY 9 [address]   ENC: [id number]                             Fax: [phone]   (Exam #: [id number])      The LMP of this 46year old,  G4, P2-0-1-2 patient was unknown, her   working JAY is 2017 and the current gestational age is 25 weeks 2   days by 1st Trimester Sono  A sonographic examination was performed on 2017 using real time equipment  The ultrasound examination was   performed using abdominal & vaginal techniques  The patient has a BMI of   29 2  Her blood pressure today was 120/73  Earliest ultrasound found in her record: 16 12w5d 17 JAY      Problem list:   1  Multiple intramural fibroids x 6- her largest fibroid on her initial   visit was 4 2 x 2 7 x 3 3 cm    2  Advanced maternal age - NIPT was normal   3  History of a prior  section  at term followed by a successful    that was uncomplicated at term  4  History of a 5 month spontaneous  delivery after premature   rupture membranes in her 3rd pregnancy in [facility] [place], [place] in         Cardiac motion was observed at 156 bpm       INDICATIONS      advanced maternal age   previous  delivery   cervical shortening   previous  (s)   fetal anatomical survey   leiomyoma (fibroids)      Exam Types      Transvaginal   LEVEL II      RESULTS      Fetus # 1 of 1   Vertex presentation   Fetal growth appeared normal   Placenta Location = Anterior   Placenta Grade = I      MEASUREMENTS (* Included In Average GA)      AC              14 2 cm        19 weeks 1 day * (30%)   BPD              4 6 cm        19 weeks 6 days* (39%)   HC              17 3 cm        19 weeks 6 days* (33%)   Femur            3 2 cm        20 weeks 2 days* (41%)      Nuchal Fold      4 2 mm      Humerus          3 2 cm        20 weeks 6 days  (61%)   Foot             3 6 cm        21 weeks 2 days      Cerebellum       2 1 cm        20 weeks 4 days   Biorbit          3 4 cm        21 weeks 5 days   CisternaMagna    4 1 mm      HC/AC           1 22   FL/AC           0 23   FL/BPD          0 71   EFW (Ac/Fl/Hc)   312 grams - 0 lbs 11 oz      THE AVERAGE GESTATIONAL AGE is 19 weeks 6 days +/- 10 days  AMNIOTIC FLUID         Largest Vertical Pocket = 6 0 cm   Amniotic Fluid: Normal      CERVICAL EVALUATION      SUPINE      Cervical Length: 3 66 cm      OTHER TEST RESULTS           Funneling?: No             Dynamic Changes?: No        Resp  To TFP?: No      ANATOMY      Head                                    Normal   Face/Neck                               Normal   Th  Cav  Normal   Heart                                   Normal   Abd  Cav  Normal   Stomach                                 Normal   Right Kidney                            Normal   Left Kidney                             Normal   Bladder                                 Normal   Abd  Wall                               Normal   Spine                                   Normal   Extrems                                 Normal   Genitalia                               Normal   Placenta                                Normal   Umbl  Cord                              Normal   Uterus                                  Normal   PCI                                     Normal      ANATOMY DETAILS      Visualized Appearing Sonographically Normal:   HEAD: (Calvarium, BPD Level, Cavum, Lateral Ventricles, Choroid Plexus,   Cerebellum, Cisterna Magna);    FACE/NECK: (Neck, Nuchal Fold, Profile,   Orbits, Nose/Lips, Palate, Face);    TH  CAV  : (Lungs, Diaphragm);       HEART: (Four Chamber View, Proximal Left Outflow, Proximal Right Outflow,   3VV, 3 Vessel Trachea, Short Axis of Greater Vessels, Ductal Arch, Aortic   Arch, Interventricular Septum, Interatrial Septum, IVC, SVC, Cardiac Axis,   Cardiac Position);    ABD  CAV : (Liver);    STOMACH, RIGHT KIDNEY, LEFT   KIDNEY, BLADDER, ABD  WALL, SPINE: (Cervical Spine, Thoracic Spine, Lumbar   Spine, Sacrum);    EXTREMS: (Lt Humerus, Rt Humerus, Lt Forearm, Rt   Forearm, Lt Hand, Rt Hand, Lt Femur, Rt Femur, Lt Low Leg, Rt Low Leg, Lt   Foot, Rt Foot);    GENITALIA, PLACENTA, UMBL  CORD, UTERUS, PCI      FIBROIDS       myometrium fibroid located in the anterior corpus region, measuring 3 3 x   3 3 x 2 7cm with a volume of 15 4cc  Color doppler flow was not increased  The appearance was echolucent  ADNEXA      The left ovary appeared normal and measured 2 5 x 1 9 x 1 1 cm with a   volume of 2 7 cc  The right ovary appeared normal and measured 3 6 x 3 3 x   1 5 cm with a volume of 9 3 cc  IMPRESSION      Yadav IUP   19 weeks and 6 days by this ultrasound  (JAY=JUN 21 2017)   20 weeks and 2 days by 1st Tri Sono  (JAY=JUN 18 2017)   Vertex presentation   Fetal growth appeared normal   Normal anatomy survey   Regular fetal heart rate of 156 bpm   Anterior placenta      GENERAL COMMENT      On exam today the patient appears well, in no acute distress, and denies   any complaints  Her abdomen is non-tender  The patient had noninvasive prenatal testing through [place] [facility]   Her results were normal, placing her in a   very low risk category  The sensitivity of detecting Trisomy 21 with this   test is 99 1% with a specificity of 99 9%  The sensitivity of detecting   Trisomy 18 is >99 9% with a specificity of 99 6%  The sensitivity of   detecting Trisomy 13 is 91 7% with a specificity of 99 7%  Her negative   result is very reassuring to rule out the aforementioned Trisomies      Rola Howe also underwent maternal serum alpha-fetoprotein screening for open   neural tube defects with a reassuring low risk of<1: 5000  The fetal anatomic survey is complete  There is no sonographic evidence   of fetal abnormalities at this time  Good fetal movement and tone are   seen  The amniotic fluid volume appears normal   The placenta is anterior   and it appears sonographically normal   A transvaginal ultrasound was   performed to assess the cervix, which was not seen well transabdominally  The cervical length was 3 66  centimeters, which is normal for the current   gestational age  There was no significant funneling or dynamic changes   appreciated  The patient was informed of today's findings and all of her   questions were answered  The limitations of ultrasound were reviewed with   the patient, which she accepts  We discussed appropriate follow-up in detail and I recommend she return in   8 weeks for a fetal growth evaluation  Please note, in addition to the time spent discussing the results of the   ultrasound, I spent approximately 10 minutes of face-to-face time with the   patient, greater than 50% of which was spent in counseling and the   coordination of care for this patient  Thank you very much for allowing us to participate in the care of this   very nice patient  Should you have any questions, please do not hesitate   to contact our office  Crimson HexagonMAYDA M D     Electronically signed 02/07/17 23:05

## 2018-01-12 VITALS
BODY MASS INDEX: 29.62 KG/M2 | SYSTOLIC BLOOD PRESSURE: 97 MMHG | WEIGHT: 200 LBS | DIASTOLIC BLOOD PRESSURE: 72 MMHG | HEIGHT: 69 IN

## 2018-01-12 VITALS
SYSTOLIC BLOOD PRESSURE: 124 MMHG | DIASTOLIC BLOOD PRESSURE: 84 MMHG | BODY MASS INDEX: 28.73 KG/M2 | WEIGHT: 194 LBS | HEIGHT: 69 IN

## 2018-01-12 NOTE — PROGRESS NOTES
Active Problems    1  Elderly multigravida in second trimester (659 63) (O09 522)   2  Elderly multigravida, third trimester (659 63) (O09 523)   3  Fibroids (218 9) (D25 9)   4  Gestational diabetes mellitus (GDM) in third trimester, gestational diabetes method of   control unspecified (648 83) (O24 419)   5  History of  delivery, currently pregnant in second trimester (V23 41) (O09 212)   6  Short cervix in second trimester, antepartum (353 21) (O26 872)    Family History  Mother    1  No pertinent family history  Father    2  No pertinent family history    Social History    · Never a smoker    Current Meds   1  Accu-Chek FastClix Lancets Miscellaneous; 4 times a day as directed; Therapy: 86JBM9684 to (Evaluate:20Suw7713)  Requested for: 39NOT2345; Last   Rx:2017 Ordered   2  Accu-Chek SmartView In Vitro Strip; TEST 4 TIMES DAILY; Therapy: 65ZSW6276 to (Evaluate:78Zir0013)  Requested for: 71PPM5645; Last   Rx:2017 Ordered   3  Prenatal Vitamin TABS; TAKE 1 TABLET DAILY; Therapy: (Recorded:2017) to Recorded    Allergies    1  No Known Drug Allergies    2  No Known Environmental Allergies   3  No Known Food Allergies    Provider Comments  Provider Comments:   JAY: 2017  EGA: 36 3/7 weeks    Dear Mary Carmen Hooks:    Thank you for referring your patient to the Diabetes and Pregnancy Program at [facility]  The patient was seen today for medical nutrition therapy for the treatment of gestational diabetes during pregnancy  In addition to diabetes, the nutrition status is complicated by being overweight  The following was reviewed with the patient:     Â· Weight gain during in pregnancy  Based on the patient's height of 67 inches, pre-pregnancy weight of 181) pounds (BMI-27  5) we would recommend a total weight gain of 15-25 pounds for the pregnancy  o The patient's current weight is 201 pounds, and her weight gain to date is 20 pounds   Based on this, we are recommending the patient maintain her widght for the remainder of the pregnancy  Â· Meal pattern should consist of three small meals and three snacks daily  Â· Appropriate serving size of foods  Â· Incorporating protein at each meal and snack in the importance of protein in relationship to blood glucose control  Â· Individualized meal plan:2300 calorie gestational diabetes diet was previously provided to patient  Diet recall indicated patient has decreased the amount eaten at dinner and no longer needs insulin therapy for glucose control  Patient is undereating at lunch and often skips mid-afternoon snacks  Discussed how to enhance lunches and what foods to have available for afternnon snacks  Discussed how to include her favorite ethnic foods into her meal plan  Â· Post-partum diet recommendations  Â· Report blood glucose levels to the Maternal-Fetal Medicine office weekly or as directed  o Phone: [phone]  o Fax: [phone]  o Email: [email or web address]  Â· Follow up: As needed  Please contact the Diabetes and Pregnancy Program at [phone] if you have questions  Time spent with patient 12:05-12:35 PM; time spent face to face counseling greater than 50% of the appointment      Sincerely,   Anaid Brothers, MS, RD, CDE, LDN  Diabetes Educator  Diabetes and Pregnancy Program        Future Appointments    Date/Time Provider Specialty Site   2017 09:[facility]   2017 09:30 AM  [facility]   2017 10:30 AM  [facility]   Electronically signed by : Aniyah Rollins RD; May 24 2017  2:38PM EST                       (Author)    Electronically signed by : Sarah Siu; May 24 2017  2:46PM EST                       (Author)

## 2018-01-12 NOTE — PROGRESS NOTES
2017         RE: Ashlie Johnson                                   To: LILY Daigle    MR#: [id number]                                  [place] [address]   [address]   : [address] [facility] [address] [place] [address]   ENC: [id number]                             Fax: [phone]   (Exam #: [id number])      The LMP of this 38year old,  G4, P2-0-1-2 patient was unknown, her   working JAY is 2017 and the current gestational age is 37 weeks 2   days by 1st Trimester Sono  A sonographic examination was performed on 2017 using real time equipment  The ultrasound examination was performed   using abdominal technique  The patient has a BMI of 29 5  Her blood   pressure today was 97/72  Earliest ultrasound found in her record: 16 12w5d 17 JAY      Problem list:   1  Multiple intramural fibroids x 6- her largest fibroid on her initial   visit was 4 2 x 2 7 x 3 3 cm    2  Advanced maternal age - NIPT was normal   3  History of a prior  section  at term followed by a successful    that was uncomplicated at term  4  History of a 5 month spontaneous  delivery after premature   rupture membranes in her 3rd pregnancy in [facility] in [place], [place] in   Alejandro Chavis reports acutely decreased fetal movement today  She denies problems   related to vaginal bleeding,  labor, or hypertension  Cardiac motion was observed at 134 bpm       INDICATIONS      advanced maternal age      Exam Types      Biophysical Profile      RESULTS      Fetus # 1 of 1   Vertex presentation   Placenta Location = Anterior   No placenta previa   Placenta Grade = II      The NST was non-reactive with no decelerations  AMNIOTIC FLUID      Q1: 2 8      Q2: 3 4      Q3: 1 8      Q4:   CATRACHO Total = 8 1 cm      BIOPHYSICAL PROFILE      The Biophysical Profile score was 4/10     Breathin  Movement: 0  Tone: 0  AFV: 2  NST: 0   The NST was non-reactive with no decelerations  IMPRESSION      Yadav IUP   38 weeks and 2 days by 1st Tri Sono  (JAY=JUN 18 2017)   Vertex presentation   Regular fetal heart rate of 134 bpm   Anterior placenta   No placenta previa      GENERAL COMMENT      The biophysical profile score is 4 out of 10  Today's ultrasound findings and suggested management were discussed in   detail with Brigitte Hunt and her partner  Given the abnormal biophysical profile   score today, I recommended delivery  She was sent directly to Labor and   Delivery from the [facility], [facility]  for further evaluation and management  The ultrasound findings and MFM recommendation were discussed by phone   with Dr Kj Chew  The face to face time, in addition to time spent discussing ultrasound   results, was approximately 10 minutes, greater than 50% of which was spent   during counseling and coordination of care  AMENDED REPORT      MAYDA Del Real M D     Maternal-Fetal Medicine   Electronically signed 06/06/17 13:36

## 2018-01-13 VITALS
HEIGHT: 69 IN | SYSTOLIC BLOOD PRESSURE: 109 MMHG | DIASTOLIC BLOOD PRESSURE: 53 MMHG | WEIGHT: 200.6 LBS | BODY MASS INDEX: 29.71 KG/M2

## 2018-01-13 VITALS
BODY MASS INDEX: 29.8 KG/M2 | HEIGHT: 69 IN | WEIGHT: 201.2 LBS | SYSTOLIC BLOOD PRESSURE: 114 MMHG | DIASTOLIC BLOOD PRESSURE: 65 MMHG

## 2018-01-14 VITALS
BODY MASS INDEX: 28.56 KG/M2 | DIASTOLIC BLOOD PRESSURE: 62 MMHG | WEIGHT: 192.8 LBS | HEIGHT: 69 IN | SYSTOLIC BLOOD PRESSURE: 104 MMHG

## 2018-01-14 VITALS
WEIGHT: 198.2 LBS | HEIGHT: 69 IN | SYSTOLIC BLOOD PRESSURE: 120 MMHG | BODY MASS INDEX: 29.36 KG/M2 | DIASTOLIC BLOOD PRESSURE: 73 MMHG

## 2018-01-14 VITALS
WEIGHT: 193 LBS | BODY MASS INDEX: 28.58 KG/M2 | SYSTOLIC BLOOD PRESSURE: 121 MMHG | DIASTOLIC BLOOD PRESSURE: 79 MMHG | HEIGHT: 69 IN

## 2018-01-14 NOTE — PROGRESS NOTES
Chief Complaint  Chief Complaint Free Text Note Form: JAY: 6/18/2017  EGA: 37 5/7 weeks    Dear Dr Mccloud Level: Thank you for referring your patient to the Diabetes and Pregnancy Program at [address]  The patient was seen today for medical nutrition therapyre-evaluation for the treatment of gestational diabetes during pregnancy  In addition to diabetes, the nutrition status is complicated by being overweight piror to pregnancy and ethnic preferences  The following was reviewed with the patient:     Â· Weight gain during in pregnancy  Based on the patient's height of 67 inches, pre-pregnancy weight of 181 pounds (BMI-27  5) we would recommend a total weight gain of 15-25 pounds for the pregnancy  o The patient's current weight is198 pounds, and her weight gain to date is 17 pounds  Lost 3 pounds in last month  Based on this, we are recommending the patient maintain her weight for the remainder of the pregnancy  Â· Meal pattern should consist of three small meals and three snacks daily  Diet recall indicates patient skips HS snack as is not hungry, but FBS is WNL  Â· Appropriate serving size of foods  Â· Incorporating protein at each meal and snack in the importance of protein in relationship to blood glucose control  Â· Individualized meal plan: 2300 calorie gestational diabetes diet was previously provided to her  Diet recall indicates patient is eating 3 meals and 2 snacks in the proper portions  Â· Report blood glucose levels to the Maternal-Fetal Medicine office weekly or as directed  o Phone: [phone]  o Fax: [phone]  o Email: [email or web address]  Â· Follow up: As needed  Please contact the Diabetes and Pregnancy Program at [phone] if you have questions  Time spent with patient 2:05-2:30 PM; time spent face to face counseling greater than 50% of the appointment      Sincerely,   Juan Daniel Hoffman, MS, RD, CDE, LDN  Diabetes Educator  Diabetes and Pregnancy Program       Active Problems    1  Elderly multigravida in second trimester (659 63) (O09 522)   2  Elderly multigravida, third trimester (659 63) (O09 523)   3  Fibroids (218 9) (D25 9)   4  Gestational diabetes mellitus (GDM) in third trimester, gestational diabetes method of   control unspecified (648 83) (O24 419)   5  History of  delivery, currently pregnant in second trimester (V23 41) (O09 212)   6  Short cervix in second trimester, antepartum (958 73) (O26 872)    Family History  Mother    1  No pertinent family history  Father    2  No pertinent family history    Social History    · Never a smoker    Current Meds   1  Accu-Chek FastClix Lancets Miscellaneous; 4 times a day as directed; Therapy: 21QYB9982 to (Evaluate:58Xnq4569)  Requested for: 94JWG7275; Last   Rx:2017 Ordered   2  Accu-Chek SmartView In Vitro Strip; TEST 4 TIMES DAILY; Therapy: 42XEN6846 to (Evaluate:45Mbg8167)  Requested for: 10HEJ6086; Last   Rx:2017 Ordered   3  Monistat 7 CREA; Therapy: (Recorded:2017) to Recorded   4  Prenatal Vitamin TABS; TAKE 1 TABLET DAILY; Therapy: (Recorded:2017) to Recorded    Allergies    1  No Known Drug Allergies    2  No Known Environmental Allergies   3   No Known Food Allergies    Future Appointments    Date/Time Provider Specialty Site   2017 10:00 AM  [address]   2017 10:30 AM  [address]   2017 10:30 AM  [facility], [address] [place] [address]   Electronically signed by : Yoli Tellez RD; 2017  5:21PM EST                       (Author)    Electronically signed by : Beto Christine [address] [place] [address]; 2017 12:45PM EST                       (Author)

## 2018-01-14 NOTE — PROGRESS NOTES
2017         RE: Ignacio George                                   To: LILY Dominguez    MR#: [id number]                                  [place] [address]   [address]   : MAY [address] [place] [facility]   ENC: [id number]                             Fax: [phone]   (Exam #: [id number])      The LMP of this [address] old,  G4, P2-0-1-2 patient was unknown, her   working JAY is 2017 and the current gestational age is 12 weeks 4   days by  [facility] [address]  A sonographic examination was performed on 2017 using real time equipment  The ultrasound examination was performed   using abdominal & vaginal techniques  The patient has a BMI of 28 4  Her   blood pressure today was 104/62  Earliest ultrasound found in her record: 16 12w5d 17 JAY      Cardiac motion was observed at 161 bpm       INDICATIONS      advanced maternal age   previous  delivery      Exam Types      Level I   Transvaginal      RESULTS      Fetus # 1 of 1   Breech presentation   Fetal growth appeared normal   Placenta Location = Anterior, right lateral   Placenta Grade = I      MEASUREMENTS (* Included In Average GA)      AC              10 5 cm        16 weeks 1 day * (48%)   BPD              3 8 cm        17 weeks 4 days*   HC              13 0 cm        16 weeks 4 days*   Femur            2 2 cm        16 weeks 4 days*      HC/AC           1 24   FL/AC           0 21   FL/BPD          0 57   EFW (Ac/Fl/Hc)   157 grams - 0 lbs 6 oz      THE AVERAGE GESTATIONAL AGE is 16 weeks 5 days +/- 7 days  AMNIOTIC FLUID         Largest Vertical Pocket = 5 4 cm   Amniotic Fluid: Normal      CERVICAL EVALUATION      The cervix appeared normal (Ultrasound Examination)  SUPINE      Cervical Length: 2 80 cm      OTHER TEST RESULTS           Funneling?: No             Dynamic Changes?: No        Resp   To TFP?: No                      Debris?: Yes      ANATOMY DETAILS Visualized Appearing Sonographically Normal:   HEAD: (Calvarium, BPD Level, Choroid Plexus);    FACE/NECK: (Neck, Nuchal   Fold, Profile, Orbits, Nose/Lips, Palate, Face);    TH  CAV  : (Lungs,   Diaphragm); HEART: (Proximal Left Outflow);    ABD  CAV : (Liver);      STOMACH, RIGHT KIDNEY, LEFT KIDNEY, BLADDER, ABD  WALL, SPINE: (Cervical   Spine, Thoracic Spine, Lumbar Spine, Sacrum);    EXTREMS: (Lt Humerus, Rt   Humerus, Lt Forearm, Rt Forearm, Rt Hand, Lt Femur, Rt Femur, Lt Low Leg,   Rt Low Leg, Lt Foot, Rt Foot); PLACENTA, UMBL  CORD, PCI      Not Visualized:   HEAD: (Cavum, Lateral Ventricles, Cerebellum, Cisterna Magna); HEART:   (Four Chamber View, Proximal Right Outflow, 3 Vessel Trachea);    EXTREMS:   (Lt Hand);    GENITALIA      Abnormal:   UTERUS      FIBROIDS      1   myometrium fibroid located in the right anterior fundus region,   measuring 4 2 x 2 7 x 3 3cm with a volume of 19 6cc  Color doppler flow   was not increased  The appearance was heterogeneous  2   myometrium fibroid located in the anterior fundus region, measuring   1 9 x 2 2 x 1 5cm with a volume of 3 3cc  Color doppler flow was not   increased  The appearance was heterogeneous  3   myometrium fibroid located in the middle corpus region, measuring 3 8   x 2 2 x 3 1cm with a volume of 13 6cc  Color doppler flow was not   increased  The appearance was heterogeneous  4   myometrium fibroid located in the middle corpus region, measuring 2 4   x 1 6 x 1 8cm with a volume of 3 6cc  Color doppler flow was not   increased  The appearance was heterogeneous  5   myometrium fibroid located in the middle anterior corpus region,   measuring 1 9 x 1 6 x 1 6cm with a volume of 2 5cc  Color doppler flow was   not increased  The appearance was heterogeneous  6   myometrium fibroid located in the left anterior corpus region,   measuring 2 6 x 2 3 x 1 4cm with a volume of 4 4cc  Color doppler flow was   not increased  The appearance was heterogeneous  ADNEXA      The left ovary appeared normal and measured 2 7 x 2 3 x 1 5 cm with a   volume of 4 9 cc  The right ovary appeared normal and measured 2 5 x 3 2 x   2 2 cm with a volume of 9 2 cc  IMPRESSION      Yadav IUP   16 weeks and 5 days by this ultrasound  (JAY=2017)   16 weeks and 4 days by 1st Tri Sono  (JAY=2017)   Breech presentation   Fetal growth appeared normal   Regular fetal heart rate of 161 bpm   Anterior, right lateral placenta      CONSULT COMMENT      Thank you very much for your kind referral of Alessandro Mann to the [address] [place] [address] on 2017 for early second trimester   ultrasound evaluation and  consult  Kathi Thibodeaux is a 65-year-old black   female who is currently at 16-4/7 weeks gestation by an estimated due date   of 2017  She is referred for the indications of advanced maternal   age and a history of a prior spontaneous  birth  She will be 38  years old at her estimated due date  Kathi Thibodeaux met with Lois Arroyo   earlier in the pregnancy for genetic counseling for the indication of   advanced maternal age  Noninvasive prenatal testing revealed negative   results  Recent MSAFP testing revealed a normal value of 0 90 MoM  Kathi Thibodeaux   has no complaints today  She reports no vaginal bleeding, abdominal or   pelvic pain, or leakage of amniotic fluid from the vagina  Her prenatal   course so far has been unremarkable  Kathi Thibodeaux has a history of an initial delivery by  section in  at   term following an uncomplicated prenatal course  She had a successful    at term in  following an uncomplicated prenatal course  She delivered   appropriately grown babies each time, each currently healthy  Her only   other pregnancy was complicated by  premature rupture the membranes   and spontaneous  birth at "5 months" gestation in    She   required a D&E procedure following that delivery  Kalpana has unremarkable past medical and surgical histories otherwise  She   currently takes no medication with the exception of a prenatal vitamin on   a daily basis and has no known drug allergy  She denies tobacco, alcohol,   or illicit drug use during the pregnancy  The family medical history is   negative with respect to first degree relatives with diabetes,   hypertension, or venous thromboembolism  The family  genetic history is   negative with respect to genetic abnormalities, birth defects, or mental   retardation  There is no family history of sickle cell anemia  No fetal structural abnormality or ultrasound marker for aneuploidy is   identified on the ultrasound study today limited in detail secondary to   the early gestational age  Fetal growth and amniotic fluid volume are   normal   The placenta is normal in appearance  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  The cervical length is mildly shortened  Cervical debris is present  Cervical funneling is not present  Neither provocative nor dynamic change   is appreciated  Today's ultrasound findings and suggested follow-up were discussed in   detail with Kalpana  Her non invasive prenatal testing results were   discussed in detail  Kalpana and I discussed that she has a likelihood for   recurrence of spontaneous  birth between 27 and 50%  I recommended   that she begin treatment soon with weekly IM 17-OHPC which will decrease   her risk for recurrence of spontaneous  birth by about 30%  Kalpana   does not wish to be treated with progesterone at this time  I recommended   that she return to the [facility]  in one week for repeat   transvaginal ultrasound evaluation to assess her cervix  Cervical cerclage   is recommended with cervical shortening below 25 mm at a gestational age   of less than 24 weeks   Serial cervical sonography is recommended up until   that gestational age to determine whether or not cervical cerclage is   indicated  Level II ultrasound evaluation will be performed at about 20   weeks gestation  Serial fetal interval growth scans are recommended during   the second half of the pregnancy  Weekly nonstress testing is recommended   for additional pregnancy surveillance for the indication of advanced   maternal age beginning at 42 weeks gestation, earlier if otherwise   clinically indicated, given and associated increased risk for stillbirth  Her risk for a live born baby with Down syndrome age 36 is one in 80  Definitive prenatal diagnosis at this point is possible only through   genetic amniocentesis  The face to face time, in addition to time spent discussing ultrasound   results, was approximately 15 minutes, greater than 50% of which was spent   during counseling and coordination of care  MAYDA Barnhart M D     Maternal-Fetal Medicine   Electronically signed 01/06/17 06:51

## 2018-01-15 VITALS
HEIGHT: 69 IN | SYSTOLIC BLOOD PRESSURE: 109 MMHG | BODY MASS INDEX: 29.78 KG/M2 | WEIGHT: 201.05 LBS | DIASTOLIC BLOOD PRESSURE: 76 MMHG

## 2018-01-15 VITALS
BODY MASS INDEX: 30.18 KG/M2 | SYSTOLIC BLOOD PRESSURE: 109 MMHG | WEIGHT: 203.8 LBS | DIASTOLIC BLOOD PRESSURE: 56 MMHG | HEIGHT: 69 IN

## 2018-01-15 VITALS
HEIGHT: 69 IN | DIASTOLIC BLOOD PRESSURE: 71 MMHG | WEIGHT: 198.03 LBS | BODY MASS INDEX: 29.33 KG/M2 | SYSTOLIC BLOOD PRESSURE: 111 MMHG

## 2018-01-15 NOTE — PROGRESS NOTES
MAR 30 2017         RE: Townsend Light                                   To: Peggy LILY Aldridge    MR#: [id number]                                  [facility]   [address]   : MAY [address] [facility] [address] [place] [address]   ENC: [id number]                             Fax: [phone]   (Exam #: [id number])      The LMP of this 46year old,  G4, P2-0-1-2 patient was unknown, her   working JAY is 2017 and the current gestational age is 35 weeks 4   days by [address] [place] [address]  A sonographic examination was performed on MAR   30 2017 using real time equipment  The ultrasound examination was   performed using abdominal technique  The patient has a BMI of 30 1  Her   blood pressure today was 109/56  Earliest ultrasound found in her record: 16 12w5d 17 JAY      Problem list:   1  Multiple intramural fibroids x 6- her largest fibroid on her initial   visit was 4 2 x 2 7 x 3 3 cm    2  Advanced maternal age - NIPT was normal   3  History of a prior  section  at term followed by a successful    that was uncomplicated at term  4  History of a 5 month spontaneous  delivery after premature   rupture membranes in her 3rd pregnancy in [facility] in [place], [place] in         Cardiac motion was observed at 156 bpm       INDICATIONS      advanced maternal age   previous  delivery   cervical shortening   previous  (s)   leiomyoma (fibroids)   diabetes, gestational      Exam Types      Level I      RESULTS      Fetus # 1 of 1   Vertex presentation   Fetal growth appeared normal   Placenta Location = Anterior   No placenta previa   Placenta Grade = I      MEASUREMENTS (* Included In Average GA)      AC              25 9 cm        30 weeks 1 day * (71%)   BPD              7 0 cm        28 weeks 0 days* (32%)   HC              26 8 cm        28 weeks 6 days* (42%)   Femur            5 4 cm        28 weeks 6 days* (43%) Cerebellum       3 3 cm        29 weeks 5 days      HC/AC           1 03   FL/AC           0 21   FL/BPD          0 78   EFW (Ac/Fl/Hc)  1404 grams - 3 lbs 2 oz                 (60%)      THE AVERAGE GESTATIONAL AGE is 29 weeks 0 days +/- 18 days  AMNIOTIC FLUID      Q1: 4 1      Q2: 3 9      Q3: 2 1      Q4: 3 6   CATRACHO Total = 13 7 cm   Amniotic Fluid: Normal      ANATOMY DETAILS      Visualized Appearing Sonographically Normal:   HEAD: (Calvarium, BPD Level, Cavum, Lateral Ventricles, Choroid Plexus,   Cerebellum, Cisterna Magna);    TH  CAV  : (Lungs, Diaphragm); HEART:   (Proximal Left Outflow, Proximal Right Outflow, 3VV, 3 Vessel Trachea,   Cardiac Axis, Cardiac Position);    STOMACH, RIGHT KIDNEY, LEFT KIDNEY,   BLADDER, PLACENTA      Suboptimally Visualized:   HEART: (Four Chamber View)      FIBROIDS      Intramural myometrium fibroid located in the anterior corpus region,   measuring 3 8 x 4 2 x 3 4cm with a volume of 28 4cc  Color doppler flow   was not increased  The appearance was heterogeneous  MASSES      1)  Right ovarian cystic mass  The cyst measured 4 20 x 4 20 x 4 10 cm,   simple in appearance with a smooth lining  Sonolucent fluid  Color doppler   flow was performed  IMPRESSION      Yadav IUP   29 weeks and 0 days by this ultrasound  (JAY=ROSE MARY 15 2017)   28 weeks and 4 days by 1st Tri Sono  (JAY=JUN 18 2017)   Vertex presentation   Fetal growth appeared normal   Regular fetal heart rate of 156 bpm   Anterior placenta   No placenta previa      GENERAL COMMENT      On exam today the patient appears well, in no acute distress, and denies   any complaints  Her abdomen is non-tender  There has been appropriate interval fetal growth  Good fetal movement and   tone are seen    The amniotic fluid volume appears normal   The placenta is   anterior and it appears sonographically normal   The anatomic structures   listed above could not be optimally imaged today because of fetal position; however, these structures were seen on a prior ultrasound and   appeared sonographically normal   The patient was informed of today's   findings and all of her questions were answered  The limitations of   ultrasound were reviewed with the patient   labor precautions and   fetal kick counts were reviewed  Nick Saeed was recently checked for gestational diabetes with a one-hour   glucose screen of 200 mg/dL  That is diagnostic for gestational diabetes  She also underwent a 3 hour glucose tolerance test in which 3 out of 4 of   her values were abnormal including a one-hour of 196 mg/dL, a two-hour of   186 mg/dL, and a 3 hour of 170 mg/dL  This is also diagnostic for   gestational diabetes  Gestational diabetes mellitus (GDM) is defined as glucose intolerance with   onset during pregnancy  This affects between 2% and 14% of all gravid   women  We discussed that pregnancies complicated by GDM are associated   with increased  and maternal morbidity  Fetal risks include   macrosomia, shoulder dystocia, birth injuries, hypoglycemia,   hyperbilirubinemia, and potential long-term sequelae such as obesity and   impaired intellectual achievement  Maternal risks include preeclampsia   and hypertensive disorders of pregnancy, operative delivery, and the   development of subsequent diabetes mellitus  Aggressive treatment of GDM   with diet, exercise, and medications including metformin, glyburide, and   insulin have been shown to decrease fetal and  morbidity and   mortality  Nick Saeed will meet with our diabetic educators today to learn   how to check her blood sugars 4 times a day and discuss dietary changes as   well as goals for blood sugars which are fastings less than 90 mg/dL and 2   hours postprandial less than 120 mg/dL  Her  is diabetic and she   states that she has been checking her blood sugars and getting levels as   high as 190 4 in the evening  Recommend the patient return in 4 weeks for a fetal growth evaluation   secondary to Gestational diabetes  Thank you very much for allowing us to participate in the care of this   very nice patient  Should you have any questions, please do not hesitate   to contact our office  Please note, in addition to the time spent discussing the results of the   ultrasound, I spent approximately 15 minutes of face-to-face time with the   patient, greater than 50% of which was spent in counseling and the   coordination of care for this patient  MAYDA Power , LILY Preciado     Electronically signed 03/30/17 10:26

## 2018-01-16 NOTE — PROGRESS NOTES
2017         RE: Alessandro Mann                                   To: LILY Ba    MR#: [id number]                                  [place] [address]   [address]   : MAY  [address] [facility] [address] [place] [address]   ENC: [id number]                             Fax: [phone]   (Exam #: [id number])      The LMP of this 46year old,  G4, P2-0-1-2 patient was unknown, her   working  JAY is 2017 and the current gestational age is 12 weeks 4   days by 1st Trimester Sono  A sonographic examination was performed on 2017 using real time equipment  The ultrasound examination was   performed using abdominal & vaginal  techniques  The patient has a BMI of   28 6  Her blood pressure today was 117/69  Earliest ultrasound found in her record: 16 12w5d 17 JAY      Problem list:   1  Multiple intramural fibroids x 6- her largest fibroid on  her initial   visit was 4 2 x 2 7 x 3 3 cm    2  Advanced maternal age - NIPT was normal   3  History of a prior  section  at term followed by a successful    that was uncomplicated at term  4  History of a 5 month spontaneous   delivery after premature   rupture membranes in her 3rd pregnancy in [facility] in [place], [place] in   She required a D&E following the delivery for the   placenta  She declined the use of Khadra        Cardiac  motion was observed at 151 bpm       INDICATIONS      advanced maternal age   previous  delivery      Exam Types      Transvaginal      RESULTS      Fetus # 1 of 1   Breech presentation   Placenta Location  = Posterior   No placenta previa   Placenta Grade = I      AMNIOTIC FLUID         Largest Vertical Pocket = 4 7 cm   Amniotic Fluid: Normal      CERVICAL EVALUATION      SUPINE      Cervical Length: 2 40 cm            Funnel Length: 1 50 cm         Funnel Width: 1 10 cm               Funnelin 46 %      OTHER TEST RESULTS Funneling?: Yes            Dynamic Changes?: Yes        Resp  To TFP?: Yes      ANATOMY COMMENTS       Echogenic zane is seen at the internal os  IMPRESSION      Yadav IUP   17 weeks and 4 days by 1st Tri Sono  (JAY=2017)   Breech presentation   Regular fetal heart rate of 151 bpm   Posterior placenta   No  placenta previa      GENERAL COMMENT      The patient was informed of the findings and counseled about the   limitations of the exam in detecting all forms of fetal congenital   abnormalities  She denies any vaginal bleeding  or uterine cramping/contractions  She does   feel fetal movement  She works as a  which does not require a   lot of activity  Exam shows she is comfortable and her abdomen is non tender  Follow up recommended:    1  Her cervix today has decreased in size from 2 8 cm on her last   ultrasound to 2 4 cm today  We reviewed the literature that shows that   patient's with a history of a prior  delivery that have a cervix   less than 25 mm or 2 5 cm  statistically have less of the risk for    delivery if they have a cerclage placed  She is also aware that the use of   Khadra can decrease the risk for a recurrent  delivery by 30%  She   is also aware that she can have the option  to have have both a cerclage   and to utilize weekly injections of [place] in pregnancy  At this time she   continues to decline the use of Khadra and is interested in place on of a   cerclage  Dr Apolinar Esquivel was contacted by our office  His office will  schedule   her for a follow-up visit and with schedule her for in/out surgery to   place a cerclage  2  She has a 20 week anatomy scan planned for 20 weeks  The face to face time, in addition to time spent discussing ultrasound    results, was approximately 25 minutes, greater than 50% of which was spent   during counseling and coordination of care  MAYDA Cullen , R AZALIA Schaeffer LILY Roberts  Maternal-Fetal Medicine   Electronically  signed 17 22:52           AMENDMENTS:  1  Records Safety from [facility] in regards to her prior  delivery on 09  Her due date was 09 and so she was proximally  17 weeks pregnant  She presented to the hospital with lower abdominal pain 24 hours prior to admission and then had an onset of vaginal bleeding the day of admission  She also reports that she heard a pop and leakage of fluid  She was afebrile and exam  showed blood mixed with mucus in the vagina  There was no pooling and she was 1-2 cm dilated 20% effaced and fetal partswere felt  She underwent a evacuation and curettage for inevitable   The fetus weight 140 g and measured 22 cm from crown to  heal and 15 cm from crown to rump which goes along with a 17 week fetal size  There is no external or internal malformations and there was on a lysis of viscera and the placenta showed chorioamnionitis and a trivascular cord  On the day of admission her  white count was 13 6 hypertension was 637 PTT was 27 INR is 1 1 glucose was 60 urinalysis showed 3+ leuks and 3+ blood      Electronically signed by:Sandy VIEYRA MD,error  2017  7:59AM EST

## 2018-01-18 NOTE — PROGRESS NOTES
Chief Complaint  Patient and partner, Shameka Vanessa, seen for genetic counseling to discuss maternal age-related risks for aneuploidy  At the time of our genetic counseling session, Alexis Quintero was approximately 14 weeks 4 days pregnant with her fourth pregnancy  Her prior pregnancy history is significant for two full term deliveries and one second trimester pregnancy loss of unknown cause  At the age of 38, there is an estimated 1/62 risk for a fetal chromosome abnormality at term  Approximately half of these abnormalities are due to Down syndrome and the remainder due to other chromosome abnormalities  I reviewed with the patient the option of amniocentesis for chromosome analysis  Amniocentesis is generally performed at 16 weeks or later and has an associated procedure related risk of miscarriage of less than 1/300  Chromosome results from amniocentesis are greater than 99 9% accurate  Occasionally a repeat procedure may be necessary due to cell culture failure  Measurement of AFP from amniotic fluid is able to detect approximately 95% of open neural tube defects  We also reviewed the availability and limitations of second trimester maternal serum screening, NIPS, and level II ultrasound evaluation  Second trimester biochemical analysis is able to detect approximately 80% of pregnancies in which the fetus has Down syndrome, 80% of pregnancies in which the fetus has trisomy 25 and 80% of pregnancies which appears has an open neural tube defect  NIPS involves assessment of fragments of fetal DNA in maternal blood  This test has varying detection rates depending on the laboratory used though the quoted detection rate for Down syndrome is generally greater than 99% and detection rate for trisomy 18 is 98% or better  NIPS has a low false positive rate however consideration of prenatal diagnostic testing is always recommended following a positive NIPS      Level II ultrasound evaluation is able to detect many major physical birth defects and variations in fetal development that may be associated with chromosome abnormalities  Level II ultrasound evaluation is not able to detect all birth defects or health problems  After discussing the available prenatal diagnostic and screening procedures, this couple elected to pursue NIPS  Blood will be drawn following the genetic counseling session and sent to 's labs  In addition, the patient is planning to pursue level II ultrasound evaluation at the appropriate time  During her counseling session, histories were taken on the patient's family and her partner's family both of which were negative for any prior known cases of intellectual disability, birth defects or single gene disorders  Patient reports being of [place] and one quarter [facility] descent while her partner reports being entirely of [place] descent  They both deny having any known [employer] ancestry  Carrier screening for CF, SMA, Fragile X and hemoglobinopathies was discussed  The patient elected to decline genetic carrier screening for CF, SMA and Fragile X  Hemoglobinopathy screening is recommended if not previously performed  Lastly, we discussed the fact that it is important to keep in mind that everyone in the general population regardless of age, family history, or medical background has approximately a 3% risk of having a child with some type of her defect, genetic disease or intellectual disability  Currently there are no tests available to rule out all birth defects or health problems        Vitals  Signs   Recorded: [id number] 67:76NS   Systolic: 856, LUE, Sitting  Diastolic: 61, LUE, Sitting  Height: 5 ft 9 in  Weight: 191 lb   BMI Calculated: 28 21  BSA Calculated: 2 03  Pain Scale: 0    Signatures   Electronically signed by : Carey Winchester, ; Dec 23 2016 12:18PM EST                       (Author)

## 2018-01-18 NOTE — PROGRESS NOTES
2017         RE: [place] Light                                   To: Peggy LILY Aldridge    MR#: [id number]                                  [place] [address]   [address]   : MAY 9 [address] [facility]   ENC: [id number]                             Fax: [phone]   (Exam #: [id number])      The LMP of this 46year old,  G4, P2-0-1-2 patient was unknown, her   working JAY is 2017 and the current gestational age is 26 weeks 4   days by 1st Trimester Sono  A sonographic examination was performed on 2017 using real time equipment  The ultrasound examination was   performed using abdominal technique  The patient has a BMI of 29 7  Her   blood pressure today was 109/53  Earliest ultrasound found in her record: 16 12w5d 17 JAY      Problem list:   1  Multiple intramural fibroids x 6- her largest fibroid on her initial   visit was 4 2 x 2 7 x 3 3 cm    2  Advanced maternal age - NIPT was normal   3  History of a prior  section  at term followed by a successful    that was uncomplicated at term  4  History of a 5 month spontaneous  delivery after premature   rupture membranes in her 3rd pregnancy in [facility] in [facility] in   Brigitte Hunt has no complaints at her visit today  She reports regular fetal   movement and denies problems related to vaginal bleeding,  labor,   or hypertension  Gestational diabetes remains diet controlled        Cardiac motion was observed at 142 bpm       INDICATIONS      advanced maternal age   diabetes, gestational      Exam Types      Level I   Transvaginal      RESULTS      Fetus # 1 of 1   Vertex presentation   Fetal growth appeared normal   Placenta Location = Right lateral, fundal   No placenta previa   Placenta Grade = II      MEASUREMENTS (* Included In Average GA)      AC              28 9 cm        33 weeks 0 days* (54%)   BPD              7 6 cm        30 weeks 3 days* (<5%)   HC              29 4 cm        32 weeks 0 days* (24%)   Femur            6 2 cm        31 weeks 6 days* (37%)      Cerebellum       4 1 cm        33 weeks 6 days      HC/AC           1 02   FL/AC           0 21   FL/BPD          0 81   EFW (Ac/Fl/Hc)  1992 grams - 4 lbs 6 oz                 (42%)      THE AVERAGE GESTATIONAL AGE is 31 weeks 6 days +/- 18 days  AMNIOTIC FLUID      Q1: 1 8      Q2: 4 2      Q3:          Q4: 4 2   CATRACHO Total = 10 2 cm   Amniotic Fluid: Normal      ANATOMY DETAILS      Visualized Appearing Sonographically Normal:   HEAD: (Calvarium, BPD Level, Cavum, Lateral Ventricles, Cerebellum);      FACE/NECK: (Nose/Lips);    TH  CAV : (Diaphragm); HEART: (Four Chamber   View, Proximal Left Outflow, Proximal Right Outflow, 3VV, IVC, SVC,   Cardiac Axis, Cardiac Position);    STOMACH, RIGHT KIDNEY, LEFT KIDNEY,   BLADDER, PLACENTA      IMPRESSION      Yadav IUP   31 weeks and 6 days by this ultrasound  (JAY=JUN 23 2017)   32 weeks and 4 days by 1st Tri Sono  (JAY=JUN 18 2017)   Vertex presentation   Fetal growth appeared normal   Fetal heart rate of 142 bpm   Right lateral, fundal placenta   No placenta previa      GENERAL COMMENT      No fetal structural abnormality is identified on the Level I survey today  Fetal interval growth and amniotic fluid volume are normal  An anechoic   right ovarian cyst, without solid components or internal septations, is   identified measuring 7 1 x 5 7 x 6 6 cm in size  A small, right lateral,   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 measuring 3 6 x 2 9 x 2 8 cm in size  Today's ultrasound findings and suggested follow-up were discussed in   detail with Kerry Gomez  She will return to the [facility]  in 4 weeks to   assess interval growth for the indications of advanced maternal age and   gestational diabetes   Weekly non stress testing is recommended for   additional pregnancy surveillance beginning at 36 weeks gestation for the   indication of advanced maternal age, sooner if otherwise clinically   indicated  Daily third trimester fetal kick counting was discussed at the   visit today  Carlos King should continue to maintain contact with the Diabetes   and Pregnancy program in the [facility], [facility]  at least once a week for   review of her blood glucose values  Carlos King and I discussed that her history of gestational diabetes during   this pregnancy is associated with an increased risk for the development of   overt, Type II diabetes later in her life  Her risk for developing Type   II diabetes is as high as 50%  A 75 gram, two-hour, OGTT is suggested as   initial follow up 6-12 weeks following delivery  The face to face time, in addition to time spent discussing ultrasound   results, was approximately 10 minutes, greater than 50% of which was spent   during counseling and coordination of care  MAYDA Gruber M D     Maternal-Fetal Medicine   Electronically signed 04/27/17 10:02

## 2023-09-09 ENCOUNTER — APPOINTMENT (OUTPATIENT)
Dept: URGENT CARE | Age: 48
End: 2023-09-09
Payer: OTHER MISCELLANEOUS

## 2023-09-09 ENCOUNTER — APPOINTMENT (OUTPATIENT)
Dept: RADIOLOGY | Age: 48
End: 2023-09-09
Payer: OTHER MISCELLANEOUS

## 2023-09-09 DIAGNOSIS — S39.012A BACK STRAIN, INITIAL ENCOUNTER: Primary | ICD-10-CM

## 2023-09-09 DIAGNOSIS — S39.012A BACK STRAIN, INITIAL ENCOUNTER: ICD-10-CM

## 2023-09-09 PROCEDURE — 99213 OFFICE O/P EST LOW 20 MIN: CPT | Performed by: NURSE PRACTITIONER

## 2023-09-09 PROCEDURE — 72100 X-RAY EXAM L-S SPINE 2/3 VWS: CPT

## 2023-09-13 ENCOUNTER — APPOINTMENT (OUTPATIENT)
Dept: URGENT CARE | Age: 48
End: 2023-09-13
Payer: OTHER MISCELLANEOUS

## 2023-09-13 PROCEDURE — 99213 OFFICE O/P EST LOW 20 MIN: CPT | Performed by: NURSE PRACTITIONER

## 2023-09-20 ENCOUNTER — APPOINTMENT (OUTPATIENT)
Dept: URGENT CARE | Age: 48
End: 2023-09-20
Payer: OTHER MISCELLANEOUS

## 2023-09-20 PROCEDURE — 99213 OFFICE O/P EST LOW 20 MIN: CPT | Performed by: NURSE PRACTITIONER

## 2023-10-11 ENCOUNTER — APPOINTMENT (OUTPATIENT)
Dept: URGENT CARE | Age: 48
End: 2023-10-11
Payer: OTHER MISCELLANEOUS

## 2023-10-11 PROCEDURE — 99213 OFFICE O/P EST LOW 20 MIN: CPT | Performed by: NURSE PRACTITIONER

## 2023-10-19 ENCOUNTER — APPOINTMENT (OUTPATIENT)
Age: 48
End: 2023-10-19
Payer: OTHER MISCELLANEOUS

## 2023-10-19 PROCEDURE — 99214 OFFICE O/P EST MOD 30 MIN: CPT | Performed by: PHYSICIAN ASSISTANT

## 2023-11-02 ENCOUNTER — APPOINTMENT (OUTPATIENT)
Dept: URGENT CARE | Age: 48
End: 2023-11-02
Payer: OTHER MISCELLANEOUS

## 2023-11-02 PROCEDURE — 99213 OFFICE O/P EST LOW 20 MIN: CPT | Performed by: PHYSICIAN ASSISTANT

## 2023-11-15 ENCOUNTER — APPOINTMENT (OUTPATIENT)
Dept: URGENT CARE | Age: 48
End: 2023-11-15
Payer: OTHER MISCELLANEOUS

## 2023-11-15 PROCEDURE — 99214 OFFICE O/P EST MOD 30 MIN: CPT | Performed by: EMERGENCY MEDICINE

## 2023-12-05 ENCOUNTER — APPOINTMENT (OUTPATIENT)
Dept: URGENT CARE | Age: 48
End: 2023-12-05
Payer: OTHER MISCELLANEOUS

## 2023-12-05 PROCEDURE — 99213 OFFICE O/P EST LOW 20 MIN: CPT | Performed by: EMERGENCY MEDICINE

## 2024-01-03 ENCOUNTER — APPOINTMENT (OUTPATIENT)
Dept: URGENT CARE | Age: 49
End: 2024-01-03
Payer: OTHER MISCELLANEOUS

## 2024-01-03 PROCEDURE — 99214 OFFICE O/P EST MOD 30 MIN: CPT | Performed by: EMERGENCY MEDICINE

## 2024-01-09 ENCOUNTER — APPOINTMENT (OUTPATIENT)
Dept: URGENT CARE | Age: 49
End: 2024-01-09
Payer: OTHER MISCELLANEOUS

## 2024-01-09 PROCEDURE — 99214 OFFICE O/P EST MOD 30 MIN: CPT | Performed by: EMERGENCY MEDICINE

## 2024-01-30 ENCOUNTER — APPOINTMENT (OUTPATIENT)
Dept: URGENT CARE | Age: 49
End: 2024-01-30
Payer: OTHER MISCELLANEOUS

## 2024-01-30 PROCEDURE — 99213 OFFICE O/P EST LOW 20 MIN: CPT | Performed by: EMERGENCY MEDICINE

## 2024-02-14 ENCOUNTER — APPOINTMENT (OUTPATIENT)
Dept: URGENT CARE | Age: 49
End: 2024-02-14
Payer: OTHER MISCELLANEOUS

## 2024-02-14 PROCEDURE — 99213 OFFICE O/P EST LOW 20 MIN: CPT | Performed by: EMERGENCY MEDICINE

## (undated) DEVICE — LIGHT HANDLE COVER SLEEVE DISP BLUE STELLAR

## (undated) DEVICE — PERI/GYN PACK: Brand: CONVERTORS

## (undated) DEVICE — GLOVE INDICATOR PI UNDERGLOVE SZ 7 BLUE

## (undated) DEVICE — INTENDED FOR TISSUE SEPARATION, AND OTHER PROCEDURES THAT REQUIRE A SHARP SURGICAL BLADE TO PUNCTURE OR CUT.: Brand: BARD-PARKER SAFETY BLADES SIZE 11, STERILE

## (undated) DEVICE — LIGHT GLOVE GREEN

## (undated) DEVICE — 3M™ STERI-STRIP™ REINFORCED ADHESIVE SKIN CLOSURES, R1547, 1/2 IN X 4 IN (12 MM X 100 MM), 6 STRIPS/ENVELOPE: Brand: 3M™ STERI-STRIP™

## (undated) DEVICE — PLASTIC ADHESIVE BANDAGE: Brand: CURITY

## (undated) DEVICE — SCD SEQUENTIAL COMPRESSION COMFORT SLEEVE MEDIUM KNEE LENGTH: Brand: KENDALL SCD

## (undated) DEVICE — CHLORHEXIDINE 4PCT 4 OZ

## (undated) DEVICE — ENSEAL LAPAROSCOPIC TISSUE SEALER G2 CURVED JAW FOR USE WITH G2 GENERATOR 5MM DIAMETER 35CM SHAFT LENGTH: Brand: ENSEAL

## (undated) DEVICE — MAYO STAND COVER: Brand: CONVERTORS

## (undated) DEVICE — ENDOPATH XCEL BLADELESS TROCARS WITH STABILITY SLEEVES: Brand: ENDOPATH XCEL

## (undated) DEVICE — TOWEL SET X-RAY

## (undated) DEVICE — BOWL 32OZ TURQUOISE NON-STERILE BULK NS 100/CS: Brand: MEDEGEN MEDICAL PRODUCTS

## (undated) DEVICE — NEEDLE 25G X 1 1/2

## (undated) DEVICE — INTENT TO BE USED WITH SUTURE MATERIAL FOR TISSUE CLOSURE: Brand: RICHARD-ALLAN® NEEDLE 1/2 CIRCLE TAPER

## (undated) DEVICE — CATH URINARY ST 12FR RED RUBBER STRL

## (undated) DEVICE — SUT VICRYL 4-0 PS-2 27 IN J426H

## (undated) DEVICE — UNIVERSAL GYN LAPAROSCOPY,KIT: Brand: CARDINAL HEALTH

## (undated) DEVICE — VIAL DECANTER

## (undated) DEVICE — SUT ETHIBOND 5 V-40 30 IN MB46G

## (undated) DEVICE — DRAPE PROBE NEO-PROBE/ULTRASOUND

## (undated) DEVICE — GLOVE SRG BIOGEL 6.5

## (undated) DEVICE — ASTOUND IMPERVIOUS SURGICAL GOWN: Brand: CONVERTORS

## (undated) DEVICE — SPONGE STICK WITH PVP-I: Brand: KENDALL

## (undated) DEVICE — 3M™ STERI-STRIP™ COMPOUND BENZOIN TINCTURE 40 BAGS/CARTON 4 CARTONS/CASE C1544: Brand: 3M™ STERI-STRIP™

## (undated) DEVICE — CHLORAPREP HI-LITE 26ML ORANGE

## (undated) DEVICE — 1840 FOAM BLOCK NEEDLE COUNTER: Brand: DEVON

## (undated) DEVICE — GAUZE SPONGES,USP TYPE VII GAUZE, 12 PLY: Brand: CURITY

## (undated) DEVICE — GLOVE SRG BIOGEL ECLIPSE 6.5

## (undated) DEVICE — ENDOPATH XCEL UNIVERSAL TROCAR STABLILITY SLEEVES: Brand: ENDOPATH XCEL